# Patient Record
Sex: MALE | Race: OTHER | HISPANIC OR LATINO | URBAN - METROPOLITAN AREA
[De-identification: names, ages, dates, MRNs, and addresses within clinical notes are randomized per-mention and may not be internally consistent; named-entity substitution may affect disease eponyms.]

---

## 2020-04-16 ENCOUNTER — INPATIENT (INPATIENT)
Facility: HOSPITAL | Age: 75
LOS: 7 days | Discharge: NURSING, REHAB OR LONG-TERM CARE | DRG: 177 | End: 2020-04-24
Attending: INTERNAL MEDICINE | Admitting: INTERNAL MEDICINE
Payer: COMMERCIAL

## 2020-04-16 VITALS
OXYGEN SATURATION: 97 % | HEART RATE: 93 BPM | HEIGHT: 70 IN | TEMPERATURE: 103 F | RESPIRATION RATE: 18 BRPM | SYSTOLIC BLOOD PRESSURE: 134 MMHG | WEIGHT: 173.06 LBS | DIASTOLIC BLOOD PRESSURE: 79 MMHG

## 2020-04-16 DIAGNOSIS — E87.1 HYPO-OSMOLALITY AND HYPONATREMIA: ICD-10-CM

## 2020-04-16 DIAGNOSIS — N17.9 ACUTE KIDNEY FAILURE, UNSPECIFIED: ICD-10-CM

## 2020-04-16 DIAGNOSIS — U07.1 COVID-19: ICD-10-CM

## 2020-04-16 DIAGNOSIS — D69.6 THROMBOCYTOPENIA, UNSPECIFIED: ICD-10-CM

## 2020-04-16 DIAGNOSIS — R19.7 DIARRHEA, UNSPECIFIED: ICD-10-CM

## 2020-04-16 DIAGNOSIS — Z29.9 ENCOUNTER FOR PROPHYLACTIC MEASURES, UNSPECIFIED: ICD-10-CM

## 2020-04-16 LAB
ALBUMIN SERPL ELPH-MCNC: 3.1 G/DL — LOW (ref 3.5–5)
ALBUMIN SERPL ELPH-MCNC: 3.4 G/DL — LOW (ref 3.5–5)
ALP SERPL-CCNC: 82 U/L — SIGNIFICANT CHANGE UP (ref 40–120)
ALP SERPL-CCNC: 89 U/L — SIGNIFICANT CHANGE UP (ref 40–120)
ALT FLD-CCNC: 24 U/L DA — SIGNIFICANT CHANGE UP (ref 10–60)
ALT FLD-CCNC: 26 U/L DA — SIGNIFICANT CHANGE UP (ref 10–60)
ANION GAP SERPL CALC-SCNC: 6 MMOL/L — SIGNIFICANT CHANGE UP (ref 5–17)
ANION GAP SERPL CALC-SCNC: 9 MMOL/L — SIGNIFICANT CHANGE UP (ref 5–17)
APTT BLD: 29.8 SEC — SIGNIFICANT CHANGE UP (ref 27.5–36.3)
AST SERPL-CCNC: 28 U/L — SIGNIFICANT CHANGE UP (ref 10–40)
AST SERPL-CCNC: 29 U/L — SIGNIFICANT CHANGE UP (ref 10–40)
BASOPHILS # BLD AUTO: 0.01 K/UL — SIGNIFICANT CHANGE UP (ref 0–0.2)
BASOPHILS # BLD AUTO: 0.01 K/UL — SIGNIFICANT CHANGE UP (ref 0–0.2)
BASOPHILS NFR BLD AUTO: 0.2 % — SIGNIFICANT CHANGE UP (ref 0–2)
BASOPHILS NFR BLD AUTO: 0.2 % — SIGNIFICANT CHANGE UP (ref 0–2)
BILIRUB SERPL-MCNC: 0.6 MG/DL — SIGNIFICANT CHANGE UP (ref 0.2–1.2)
BILIRUB SERPL-MCNC: 0.9 MG/DL — SIGNIFICANT CHANGE UP (ref 0.2–1.2)
BUN SERPL-MCNC: 10 MG/DL — SIGNIFICANT CHANGE UP (ref 7–18)
BUN SERPL-MCNC: 10 MG/DL — SIGNIFICANT CHANGE UP (ref 7–18)
CALCIUM SERPL-MCNC: 8.4 MG/DL — SIGNIFICANT CHANGE UP (ref 8.4–10.5)
CALCIUM SERPL-MCNC: 8.8 MG/DL — SIGNIFICANT CHANGE UP (ref 8.4–10.5)
CHLORIDE SERPL-SCNC: 95 MMOL/L — LOW (ref 96–108)
CHLORIDE SERPL-SCNC: 99 MMOL/L — SIGNIFICANT CHANGE UP (ref 96–108)
CHOLEST SERPL-MCNC: 97 MG/DL — SIGNIFICANT CHANGE UP (ref 10–199)
CK SERPL-CCNC: 238 U/L — HIGH (ref 35–232)
CO2 SERPL-SCNC: 26 MMOL/L — SIGNIFICANT CHANGE UP (ref 22–31)
CO2 SERPL-SCNC: 28 MMOL/L — SIGNIFICANT CHANGE UP (ref 22–31)
CREAT SERPL-MCNC: 1.46 MG/DL — HIGH (ref 0.5–1.3)
CREAT SERPL-MCNC: 1.48 MG/DL — HIGH (ref 0.5–1.3)
CRP SERPL-MCNC: 5.52 MG/DL — HIGH (ref 0–0.4)
D DIMER BLD IA.RAPID-MCNC: 191 NG/ML DDU — SIGNIFICANT CHANGE UP
D DIMER BLD IA.RAPID-MCNC: 228 NG/ML DDU — SIGNIFICANT CHANGE UP
EOSINOPHIL # BLD AUTO: 0 K/UL — SIGNIFICANT CHANGE UP (ref 0–0.5)
EOSINOPHIL # BLD AUTO: 0 K/UL — SIGNIFICANT CHANGE UP (ref 0–0.5)
EOSINOPHIL NFR BLD AUTO: 0 % — SIGNIFICANT CHANGE UP (ref 0–6)
EOSINOPHIL NFR BLD AUTO: 0 % — SIGNIFICANT CHANGE UP (ref 0–6)
ERYTHROCYTE [SEDIMENTATION RATE] IN BLOOD: 36 MM/HR — HIGH (ref 0–20)
FERRITIN SERPL-MCNC: 407 NG/ML — HIGH (ref 30–400)
FIBRINOGEN PPP-MCNC: 908 MG/DL — HIGH (ref 350–510)
GLUCOSE SERPL-MCNC: 232 MG/DL — HIGH (ref 70–99)
GLUCOSE SERPL-MCNC: 237 MG/DL — HIGH (ref 70–99)
HCT VFR BLD CALC: 35.7 % — LOW (ref 39–50)
HCT VFR BLD CALC: 38.3 % — LOW (ref 39–50)
HDLC SERPL-MCNC: 36 MG/DL — LOW
HGB BLD-MCNC: 12 G/DL — LOW (ref 13–17)
HGB BLD-MCNC: 12.7 G/DL — LOW (ref 13–17)
IMM GRANULOCYTES NFR BLD AUTO: 0.4 % — SIGNIFICANT CHANGE UP (ref 0–1.5)
IMM GRANULOCYTES NFR BLD AUTO: 0.7 % — SIGNIFICANT CHANGE UP (ref 0–1.5)
INR BLD: 1.13 RATIO — SIGNIFICANT CHANGE UP (ref 0.88–1.16)
IRON SATN MFR SERPL: 19 UG/DL — LOW (ref 65–170)
LACTATE SERPL-SCNC: 1.4 MMOL/L — SIGNIFICANT CHANGE UP (ref 0.7–2)
LACTATE SERPL-SCNC: 2.9 MMOL/L — HIGH (ref 0.7–2)
LDH SERPL L TO P-CCNC: 213 U/L — SIGNIFICANT CHANGE UP (ref 120–225)
LDH SERPL L TO P-CCNC: 230 U/L — HIGH (ref 120–225)
LIPID PNL WITH DIRECT LDL SERPL: 42 MG/DL — SIGNIFICANT CHANGE UP
LYMPHOCYTES # BLD AUTO: 0.82 K/UL — LOW (ref 1–3.3)
LYMPHOCYTES # BLD AUTO: 0.95 K/UL — LOW (ref 1–3.3)
LYMPHOCYTES # BLD AUTO: 16.7 % — SIGNIFICANT CHANGE UP (ref 13–44)
LYMPHOCYTES # BLD AUTO: 20.7 % — SIGNIFICANT CHANGE UP (ref 13–44)
MAGNESIUM SERPL-MCNC: 1.9 MG/DL — SIGNIFICANT CHANGE UP (ref 1.6–2.6)
MCHC RBC-ENTMCNC: 28.4 PG — SIGNIFICANT CHANGE UP (ref 27–34)
MCHC RBC-ENTMCNC: 28.8 PG — SIGNIFICANT CHANGE UP (ref 27–34)
MCHC RBC-ENTMCNC: 33.2 GM/DL — SIGNIFICANT CHANGE UP (ref 32–36)
MCHC RBC-ENTMCNC: 33.6 GM/DL — SIGNIFICANT CHANGE UP (ref 32–36)
MCV RBC AUTO: 84.4 FL — SIGNIFICANT CHANGE UP (ref 80–100)
MCV RBC AUTO: 86.8 FL — SIGNIFICANT CHANGE UP (ref 80–100)
MONOCYTES # BLD AUTO: 0.33 K/UL — SIGNIFICANT CHANGE UP (ref 0–0.9)
MONOCYTES # BLD AUTO: 0.35 K/UL — SIGNIFICANT CHANGE UP (ref 0–0.9)
MONOCYTES NFR BLD AUTO: 7.1 % — SIGNIFICANT CHANGE UP (ref 2–14)
MONOCYTES NFR BLD AUTO: 7.2 % — SIGNIFICANT CHANGE UP (ref 2–14)
NEUTROPHILS # BLD AUTO: 3.28 K/UL — SIGNIFICANT CHANGE UP (ref 1.8–7.4)
NEUTROPHILS # BLD AUTO: 3.71 K/UL — SIGNIFICANT CHANGE UP (ref 1.8–7.4)
NEUTROPHILS NFR BLD AUTO: 71.2 % — SIGNIFICANT CHANGE UP (ref 43–77)
NEUTROPHILS NFR BLD AUTO: 75.6 % — SIGNIFICANT CHANGE UP (ref 43–77)
NRBC # BLD: 0 /100 WBCS — SIGNIFICANT CHANGE UP (ref 0–0)
NRBC # BLD: 0 /100 WBCS — SIGNIFICANT CHANGE UP (ref 0–0)
PHOSPHATE SERPL-MCNC: 3.8 MG/DL — SIGNIFICANT CHANGE UP (ref 2.5–4.5)
PLATELET # BLD AUTO: 88 K/UL — LOW (ref 150–400)
PLATELET # BLD AUTO: 97 K/UL — LOW (ref 150–400)
POTASSIUM SERPL-MCNC: 4.6 MMOL/L — SIGNIFICANT CHANGE UP (ref 3.5–5.3)
POTASSIUM SERPL-MCNC: 4.9 MMOL/L — SIGNIFICANT CHANGE UP (ref 3.5–5.3)
POTASSIUM SERPL-SCNC: 4.6 MMOL/L — SIGNIFICANT CHANGE UP (ref 3.5–5.3)
POTASSIUM SERPL-SCNC: 4.9 MMOL/L — SIGNIFICANT CHANGE UP (ref 3.5–5.3)
PROCALCITONIN SERPL-MCNC: 0.21 NG/ML — HIGH (ref 0.02–0.1)
PROT SERPL-MCNC: 7.4 G/DL — SIGNIFICANT CHANGE UP (ref 6–8.3)
PROT SERPL-MCNC: 8 G/DL — SIGNIFICANT CHANGE UP (ref 6–8.3)
PROTHROM AB SERPL-ACNC: 12.8 SEC — SIGNIFICANT CHANGE UP (ref 10–12.9)
RBC # BLD: 4.23 M/UL — SIGNIFICANT CHANGE UP (ref 4.2–5.8)
RBC # BLD: 4.41 M/UL — SIGNIFICANT CHANGE UP (ref 4.2–5.8)
RBC # FLD: 12.2 % — SIGNIFICANT CHANGE UP (ref 10.3–14.5)
RBC # FLD: 12.3 % — SIGNIFICANT CHANGE UP (ref 10.3–14.5)
SARS-COV-2 RNA SPEC QL NAA+PROBE: DETECTED
SODIUM SERPL-SCNC: 130 MMOL/L — LOW (ref 135–145)
SODIUM SERPL-SCNC: 133 MMOL/L — LOW (ref 135–145)
TOTAL CHOLESTEROL/HDL RATIO MEASUREMENT: 2.7 RATIO — LOW (ref 3.4–9.6)
TRIGL SERPL-MCNC: 93 MG/DL — SIGNIFICANT CHANGE UP (ref 10–149)
TROPONIN I SERPL-MCNC: <0.015 NG/ML — SIGNIFICANT CHANGE UP (ref 0–0.04)
WBC # BLD: 4.6 K/UL — SIGNIFICANT CHANGE UP (ref 3.8–10.5)
WBC # BLD: 4.91 K/UL — SIGNIFICANT CHANGE UP (ref 3.8–10.5)
WBC # FLD AUTO: 4.6 K/UL — SIGNIFICANT CHANGE UP (ref 3.8–10.5)
WBC # FLD AUTO: 4.91 K/UL — SIGNIFICANT CHANGE UP (ref 3.8–10.5)

## 2020-04-16 PROCEDURE — 74177 CT ABD & PELVIS W/CONTRAST: CPT | Mod: 26

## 2020-04-16 PROCEDURE — 71045 X-RAY EXAM CHEST 1 VIEW: CPT | Mod: 26

## 2020-04-16 PROCEDURE — 99285 EMERGENCY DEPT VISIT HI MDM: CPT

## 2020-04-16 RX ORDER — GUAIFENESIN/DEXTROMETHORPHAN 600MG-30MG
10 TABLET, EXTENDED RELEASE 12 HR ORAL EVERY 4 HOURS
Refills: 0 | Status: DISCONTINUED | OUTPATIENT
Start: 2020-04-16 | End: 2020-04-24

## 2020-04-16 RX ORDER — HYDROXYCHLOROQUINE SULFATE 200 MG
800 TABLET ORAL EVERY 24 HOURS
Refills: 0 | Status: COMPLETED | OUTPATIENT
Start: 2020-04-16 | End: 2020-04-16

## 2020-04-16 RX ORDER — ONDANSETRON 8 MG/1
4 TABLET, FILM COATED ORAL EVERY 6 HOURS
Refills: 0 | Status: DISCONTINUED | OUTPATIENT
Start: 2020-04-16 | End: 2020-04-24

## 2020-04-16 RX ORDER — SODIUM CHLORIDE 9 MG/ML
500 INJECTION INTRAMUSCULAR; INTRAVENOUS; SUBCUTANEOUS ONCE
Refills: 0 | Status: COMPLETED | OUTPATIENT
Start: 2020-04-16 | End: 2020-04-16

## 2020-04-16 RX ORDER — HYDROXYCHLOROQUINE SULFATE 200 MG
TABLET ORAL
Refills: 0 | Status: COMPLETED | OUTPATIENT
Start: 2020-04-16 | End: 2020-04-20

## 2020-04-16 RX ORDER — ACETAMINOPHEN 500 MG
1000 TABLET ORAL ONCE
Refills: 0 | Status: COMPLETED | OUTPATIENT
Start: 2020-04-16 | End: 2020-04-16

## 2020-04-16 RX ORDER — ENOXAPARIN SODIUM 100 MG/ML
40 INJECTION SUBCUTANEOUS DAILY
Refills: 0 | Status: DISCONTINUED | OUTPATIENT
Start: 2020-04-16 | End: 2020-04-24

## 2020-04-16 RX ORDER — SODIUM CHLORIDE 9 MG/ML
1000 INJECTION INTRAMUSCULAR; INTRAVENOUS; SUBCUTANEOUS ONCE
Refills: 0 | Status: COMPLETED | OUTPATIENT
Start: 2020-04-16 | End: 2020-04-16

## 2020-04-16 RX ORDER — HYDROXYCHLOROQUINE SULFATE 200 MG
400 TABLET ORAL EVERY 24 HOURS
Refills: 0 | Status: COMPLETED | OUTPATIENT
Start: 2020-04-17 | End: 2020-04-20

## 2020-04-16 RX ORDER — SODIUM CHLORIDE 9 MG/ML
1000 INJECTION, SOLUTION INTRAVENOUS
Refills: 0 | Status: DISCONTINUED | OUTPATIENT
Start: 2020-04-16 | End: 2020-04-19

## 2020-04-16 RX ORDER — ACETAMINOPHEN 500 MG
650 TABLET ORAL EVERY 6 HOURS
Refills: 0 | Status: DISCONTINUED | OUTPATIENT
Start: 2020-04-16 | End: 2020-04-24

## 2020-04-16 RX ORDER — ONDANSETRON 8 MG/1
4 TABLET, FILM COATED ORAL ONCE
Refills: 0 | Status: COMPLETED | OUTPATIENT
Start: 2020-04-16 | End: 2020-04-16

## 2020-04-16 RX ORDER — MORPHINE SULFATE 50 MG/1
2 CAPSULE, EXTENDED RELEASE ORAL ONCE
Refills: 0 | Status: DISCONTINUED | OUTPATIENT
Start: 2020-04-16 | End: 2020-04-16

## 2020-04-16 RX ORDER — ACETAMINOPHEN 500 MG
2 TABLET ORAL
Qty: 40 | Refills: 0
Start: 2020-04-16 | End: 2020-04-20

## 2020-04-16 RX ORDER — ONDANSETRON 8 MG/1
1 TABLET, FILM COATED ORAL
Qty: 15 | Refills: 0
Start: 2020-04-16 | End: 2020-04-20

## 2020-04-16 RX ORDER — ALBUTEROL 90 UG/1
2 AEROSOL, METERED ORAL EVERY 4 HOURS
Refills: 0 | Status: DISCONTINUED | OUTPATIENT
Start: 2020-04-16 | End: 2020-04-24

## 2020-04-16 RX ADMIN — ENOXAPARIN SODIUM 40 MILLIGRAM(S): 100 INJECTION SUBCUTANEOUS at 14:15

## 2020-04-16 RX ADMIN — ONDANSETRON 4 MILLIGRAM(S): 8 TABLET, FILM COATED ORAL at 17:29

## 2020-04-16 RX ADMIN — Medication 400 MILLIGRAM(S): at 03:45

## 2020-04-16 RX ADMIN — ONDANSETRON 4 MILLIGRAM(S): 8 TABLET, FILM COATED ORAL at 03:45

## 2020-04-16 RX ADMIN — Medication 800 MILLIGRAM(S): at 13:03

## 2020-04-16 RX ADMIN — Medication 650 MILLIGRAM(S): at 18:35

## 2020-04-16 RX ADMIN — SODIUM CHLORIDE 60 MILLILITER(S): 9 INJECTION, SOLUTION INTRAVENOUS at 18:35

## 2020-04-16 RX ADMIN — MORPHINE SULFATE 2 MILLIGRAM(S): 50 CAPSULE, EXTENDED RELEASE ORAL at 09:42

## 2020-04-16 RX ADMIN — SODIUM CHLORIDE 1000 MILLILITER(S): 9 INJECTION INTRAMUSCULAR; INTRAVENOUS; SUBCUTANEOUS at 03:45

## 2020-04-16 RX ADMIN — SODIUM CHLORIDE 500 MILLILITER(S): 9 INJECTION INTRAMUSCULAR; INTRAVENOUS; SUBCUTANEOUS at 13:13

## 2020-04-16 NOTE — H&P ADULT - PROBLEM SELECTOR PLAN 2
suspect pre renal due to  Hypovolemia   On admission Cr is 1.46  and Baseline creatinine is Unknown  F/U U/A   F/U Urine lytes  Deferring  renal ultrasound due to COVID  Gentle IVF, reassess  Monitor Creatinine  Renally dose medications, avoid nephrotoxins

## 2020-04-16 NOTE — H&P ADULT - NSHPPHYSICALEXAM_GEN_ALL_CORE
· CONSTITUTIONAL: Well appearing, awake, alert, oriented to person, place, time/situation and in no apparent distress.  · ENMT: Airway patent, Nasal mucosa clear. Mouth with normal mucosa. Throat has no vesicles, no oropharyngeal exudates and uvula is midline.  · EYES: Clear bilaterally, pupils equal, round and reactive to light.  · CARDIAC: Normal rate, regular rhythm.  Heart sounds S1, S2.  No murmurs, rubs or gallops.  · RESPIRATORY: Breath sounds clear and equal bilaterally.  · GASTROINTESTINAL: Abdomen soft, non-tender, no guarding.  · MUSCULOSKELETAL: Spine appears normal, range of motion is not limited, no muscle or joint tenderness  · NEUROLOGICAL: Alert and oriented, no focal deficits, no motor or sensory deficits.  · SKIN: Skin normal color for race, warm, dry and intact. No evidence of rash.  · PSYCHIATRIC: Alert and oriented to person, place, time/situation. normal mood and affect. no apparent risk to self or others.  · HEME LYMPH: No adenopathy or splenomegaly. No cervical or inguinal lymphadenopathy.

## 2020-04-16 NOTE — H&P ADULT - PROBLEM SELECTOR PLAN 5
Pt has diarrhea after eating outside food.  DDx Viral Gastroenteritis vs COVID infection  f/u GI PCR  F/U stool ova and cultures  f/u Calprotectin, ESR, CRP, Stool cultures

## 2020-04-16 NOTE — H&P ADULT - PROBLEM SELECTOR PLAN 1
Pt is saturating on 97 on RA   CXR : Bilateral opacities are present  CT Abdomen showed Bilateral opacities are present.  s/p 1.5L in ED    f/u Flu, COVID, RVP  f/u D dimer, Ferritin, LDH, Procalcitonin, ESR, CRP  f/u Strep, Mycoplasma, Legionella  Isolation precautions  Started on Hydroxychloroquine

## 2020-04-16 NOTE — ED ADULT NURSE NOTE - NS ED NURSE LEVEL OF CONSCIOUSNESS AFFECT
NEUROLOGICAL - ADULT    • Achieves stable or improved neurological status Not Progressing          CARDIOVASCULAR - ADULT    • Maintains optimal cardiac output and hemodynamic stability Progressing    • Absence of cardiac arrhythmias or at baseline Progres Calm

## 2020-04-16 NOTE — ED ADULT NURSE REASSESSMENT NOTE - NS ED NURSE REASSESS COMMENT FT1
received pt to 2, pt a/o x3, with complaints of abd pain/nausea since Saturday with intermittent fever. pt breathing evenly/unlabored, no cp. skin warm/dry. iv placed/labs drawn. meds as noted. rn assessment done. pt for md gonzalez

## 2020-04-16 NOTE — ED PROVIDER NOTE - PROGRESS NOTE DETAILS
patient's son left contact, tashaopal maurilio, # [phone] Patient xray consistent with covid. abd nontender on repeat exam. patient endorses symptoms resolved. tolerated po. instructed to continue po hydration at home. return for new or worsening symptoms including sob. otherwise meds provided for nausea and pain, saturation stable on dc patient on discharge noting again with abd pain. will perform ct, r.o surgical pathology, pain control and reassess Patient still feeling weak, abdominal pain. CT reveals pneumonia, no abdominal findings. admit for hydration pain control

## 2020-04-16 NOTE — H&P ADULT - ASSESSMENT
74 y.o presenting with abd pain, endorses n, v, diarrhea. endorses abd pain. Admitted to medicine for Pneumonia and suspicion for COVID.

## 2020-04-16 NOTE — H&P ADULT - PROBLEM SELECTOR PLAN 6
IMPROVE VTE Individual Risk Assessment    RISK                                                                Points  [  ] Previous VTE                                                  3  [  ] Thrombophilia                                               2  [  ] Lower limb paralysis                                      2        (unable to hold up >15 seconds)    [  ] Current Cancer                                              2         (within 6 months)  [x ] Immobilization > 24 hrs                                1  [  ] ICU/CCU stay > 24 hours                              1  [x  ] Age > 60                                                      1  IMPROVE VTE Score ___DVT ppx Lovenox 40 sub q______  )

## 2020-04-16 NOTE — ED PROVIDER NOTE - OBJECTIVE STATEMENT
74 y.o presenting with abd pain, endorses n, v, diarrhea. endorses abd pain as diffused. fever noted on arrival. denies recent travel, sick contact, cp, cough.

## 2020-04-16 NOTE — ED PROVIDER NOTE - PATIENT PORTAL LINK FT
You can access the FollowMyHealth Patient Portal offered by [facility] by registering at the following website: [email or web address]. By joining [facility]’s FollowMyHealth portal, you will also be able to view your health information using other applications (apps) compatible with our system.

## 2020-04-16 NOTE — ED PROVIDER NOTE - CLINICAL SUMMARY MEDICAL DECISION MAKING FREE TEXT BOX
Patient presenting with fever, abd, diarrhea, vomiting. no point tenderness on exam. will obtain lab, xray, ct, assess for covid, surgical abd. ed obs and reassess Patient presenting with fever, abd, diarrhea, vomiting. no point tenderness on exam. will obtain lab, xray, ct, assess for covid, surgical abd. ed obs and reassess. symptoms over 1 week, saturation stable, will reassess

## 2020-04-16 NOTE — H&P ADULT - ATTENDING COMMENTS
74 y.o presenting with abd pain, endorses n, v, diarrhea. endorses abd pain as diffused. fever noted on arrival. denies recent travel, sick contact, cp, cough.    adm pt seen in bed, vitals stable, physical exam reveals lungs basilar crackles, heart s1s2, abd soft nd nt bs+, ext no edema. labs and diagnostic test result reviewed    assessment  --  pneumonia 2nd to covid- 19, hyponatremia, saida likely 2nd to diarrhea 2nd to covid-19      plan  --  admit to med, plaquenil, ergocalciferol, contact and airborne isolation, cont albuterol inhaler, cont supportive care with tylenol prn, robitussin prn and O2 via nasal canula as needed, ivf ns    procalcitonin, legionella Ag, strep, mycoplasma Ag, F/u aptt, inr, D-dimer, esr, crp, ldh, ferritin, lactate, t cell subset, cbc, bmp, mg, phos, lipids, tsh, bld cx, ua, ucx      pulm cons

## 2020-04-16 NOTE — ED PROVIDER NOTE - CARE PLAN
Principal Discharge DX:	Viral illness Principal Discharge DX:	COVID-19 virus detected  Secondary Diagnosis:	Abdominal pain

## 2020-04-16 NOTE — H&P ADULT - NSHPREVIEWOFSYSTEMS_GEN_ALL_CORE
REVIEW OF SYSTEMS:    CONSTITUTIONAL: No weakness, fevers or chills  EYES/ENT: No visual changes;  No vertigo or throat pain   NECK: No pain or stiffness  RESPIRATORY: No cough, wheezing, hemoptysis; No shortness of breath  CARDIOVASCULAR: No chest pain or palpitations  GASTROINTESTINAL:  abdominal or epigastric pain. nausea, vomiting,   GENITOURINARY: No dysuria, frequency or hematuria  NEUROLOGICAL: No numbness or weakness  SKIN: No itching, burning, rashes, or lesions   All other review of systems is negative unless indicated above.

## 2020-04-17 LAB
24R-OH-CALCIDIOL SERPL-MCNC: 57.9 NG/ML — SIGNIFICANT CHANGE UP (ref 30–80)
A1C WITH ESTIMATED AVERAGE GLUCOSE RESULT: 7.3 % — HIGH (ref 4–5.6)
ALBUMIN SERPL ELPH-MCNC: 2.6 G/DL — LOW (ref 3.5–5)
ALP SERPL-CCNC: 84 U/L — SIGNIFICANT CHANGE UP (ref 40–120)
ALT FLD-CCNC: 24 U/L DA — SIGNIFICANT CHANGE UP (ref 10–60)
ANION GAP SERPL CALC-SCNC: 7 MMOL/L — SIGNIFICANT CHANGE UP (ref 5–17)
AST SERPL-CCNC: 30 U/L — SIGNIFICANT CHANGE UP (ref 10–40)
BILIRUB SERPL-MCNC: 0.6 MG/DL — SIGNIFICANT CHANGE UP (ref 0.2–1.2)
BUN SERPL-MCNC: 10 MG/DL — SIGNIFICANT CHANGE UP (ref 7–18)
CALCIUM SERPL-MCNC: 7.9 MG/DL — LOW (ref 8.4–10.5)
CHLORIDE SERPL-SCNC: 104 MMOL/L — SIGNIFICANT CHANGE UP (ref 96–108)
CO2 SERPL-SCNC: 25 MMOL/L — SIGNIFICANT CHANGE UP (ref 22–31)
CREAT SERPL-MCNC: 1.32 MG/DL — HIGH (ref 0.5–1.3)
CRP SERPL-MCNC: 6.76 MG/DL — HIGH (ref 0–0.4)
ESTIMATED AVERAGE GLUCOSE: 163 MG/DL — HIGH (ref 68–114)
FERRITIN SERPL-MCNC: 487 NG/ML — HIGH (ref 30–400)
FOLATE SERPL-MCNC: >20 NG/ML — SIGNIFICANT CHANGE UP
GLUCOSE SERPL-MCNC: 172 MG/DL — HIGH (ref 70–99)
HAV IGM SER-ACNC: ABNORMAL
HBV CORE IGM SER-ACNC: SIGNIFICANT CHANGE UP
HBV SURFACE AG SER-ACNC: SIGNIFICANT CHANGE UP
HCT VFR BLD CALC: 34.6 % — LOW (ref 39–50)
HCV AB S/CO SERPL IA: 0.12 S/CO — SIGNIFICANT CHANGE UP (ref 0–0.99)
HCV AB SERPL-IMP: SIGNIFICANT CHANGE UP
HGB BLD-MCNC: 11.3 G/DL — LOW (ref 13–17)
HIV 1+2 AB+HIV1 P24 AG SERPL QL IA: SIGNIFICANT CHANGE UP
MCHC RBC-ENTMCNC: 28.8 PG — SIGNIFICANT CHANGE UP (ref 27–34)
MCHC RBC-ENTMCNC: 32.7 GM/DL — SIGNIFICANT CHANGE UP (ref 32–36)
MCV RBC AUTO: 88.3 FL — SIGNIFICANT CHANGE UP (ref 80–100)
NRBC # BLD: 0 /100 WBCS — SIGNIFICANT CHANGE UP (ref 0–0)
PLATELET # BLD AUTO: 96 K/UL — LOW (ref 150–400)
POTASSIUM SERPL-MCNC: 4.3 MMOL/L — SIGNIFICANT CHANGE UP (ref 3.5–5.3)
POTASSIUM SERPL-SCNC: 4.3 MMOL/L — SIGNIFICANT CHANGE UP (ref 3.5–5.3)
PROCALCITONIN SERPL-MCNC: 0.19 NG/ML — HIGH (ref 0.02–0.1)
PROT SERPL-MCNC: 6.5 G/DL — SIGNIFICANT CHANGE UP (ref 6–8.3)
RAPID RVP RESULT: SIGNIFICANT CHANGE UP
RBC # BLD: 3.92 M/UL — LOW (ref 4.2–5.8)
RBC # FLD: 12.6 % — SIGNIFICANT CHANGE UP (ref 10.3–14.5)
S PYO DNA THROAT QL NAA+PROBE: SIGNIFICANT CHANGE UP
SODIUM SERPL-SCNC: 136 MMOL/L — SIGNIFICANT CHANGE UP (ref 135–145)
VIT B12 SERPL-MCNC: 1286 PG/ML — HIGH (ref 232–1245)
WBC # BLD: 5.72 K/UL — SIGNIFICANT CHANGE UP (ref 3.8–10.5)
WBC # FLD AUTO: 5.72 K/UL — SIGNIFICANT CHANGE UP (ref 3.8–10.5)

## 2020-04-17 RX ORDER — SODIUM CHLORIDE 9 MG/ML
500 INJECTION INTRAMUSCULAR; INTRAVENOUS; SUBCUTANEOUS ONCE
Refills: 0 | Status: DISCONTINUED | OUTPATIENT
Start: 2020-04-17 | End: 2020-04-17

## 2020-04-17 RX ORDER — SODIUM CHLORIDE 9 MG/ML
500 INJECTION INTRAMUSCULAR; INTRAVENOUS; SUBCUTANEOUS ONCE
Refills: 0 | Status: COMPLETED | OUTPATIENT
Start: 2020-04-17 | End: 2020-04-17

## 2020-04-17 RX ADMIN — ONDANSETRON 4 MILLIGRAM(S): 8 TABLET, FILM COATED ORAL at 09:30

## 2020-04-17 RX ADMIN — SODIUM CHLORIDE 1000 MILLILITER(S): 9 INJECTION INTRAMUSCULAR; INTRAVENOUS; SUBCUTANEOUS at 03:34

## 2020-04-17 RX ADMIN — ONDANSETRON 4 MILLIGRAM(S): 8 TABLET, FILM COATED ORAL at 20:55

## 2020-04-17 RX ADMIN — ENOXAPARIN SODIUM 40 MILLIGRAM(S): 100 INJECTION SUBCUTANEOUS at 12:32

## 2020-04-17 RX ADMIN — Medication 650 MILLIGRAM(S): at 13:04

## 2020-04-17 RX ADMIN — Medication 10 MILLILITER(S): at 01:47

## 2020-04-17 RX ADMIN — Medication 400 MILLIGRAM(S): at 12:32

## 2020-04-17 RX ADMIN — Medication 650 MILLIGRAM(S): at 01:47

## 2020-04-17 NOTE — CONSULT NOTE ADULT - ASSESSMENT
1.  PNA  - Likely 2nd to Covid-19.   - Covid-19 PCR positive   - CXR noted; ? small effusion    Hold diuretics given CARITO.   - Continue Plaquenil   - Continue Vit C, Thiamine, Zinc   - Robitussin PRN for cough  - Tylenol PRN for temp  - Oxygen supplement   - Taper off O2 as tolerate per patient   - Monitor labs  - Monitor VS. temp and SpO2.   - DVT and GI PPX   - F/U CXR.   - Albuterol MDI - PRN  - Isolation Precautions  - Intermittent prone positioning as tolerated by pt     2. Dizziness  - S/P RRT with hypotension and bradycardia  - Continue IVF  - Monitor VS.     3. CARITO   - Likely 2nd to dehydration   - Continue IVF  - Monitor labs.   - Avoid nephrotoxic drugs.

## 2020-04-17 NOTE — PROGRESS NOTE ADULT - SUBJECTIVE AND OBJECTIVE BOX
Patient is a 74y old  Male who presents with a chief complaint of Abdominal pain (16 Apr 2020 11:46)    pt seen in icu [  ], reg med floor [   ], bed [  ], chair at bedside [   ], a+o x3 [  ], lethargic [  ],  nad [  ]    hunter [  ], ngt [  ], peg [  ], et tube [  ], cent line [  ], picc line [  ]        Allergies    No Known Allergies        Vitals    T(F): 98.5 (04-17-20 @ 00:42), Max: 100.4 (04-16-20 @ 18:43)  HR: 73 (04-17-20 @ 06:01) (60 - 90)  BP: 114/62 (04-17-20 @ 06:01) (101/60 - 148/75)  RR: 22 (04-17-20 @ 00:42) (16 - 22)  SpO2: 98% (04-17-20 @ 00:42) (97% - 99%)  Wt(kg): --  CAPILLARY BLOOD GLUCOSE          Labs                          11.3   5.72  )-----------( 96       ( 17 Apr 2020 05:58 )             34.6       04-16    133<L>  |  99  |  10  ----------------------------<  237<H>  4.9   |  28  |  1.48<H>    Ca    8.4      16 Apr 2020 15:26  Phos  3.8     04-16  Mg     1.9     04-16    TPro  7.4  /  Alb  3.1<L>  /  TBili  0.6  /  DBili  x   /  AST  29  /  ALT  24  /  AlkPhos  82  04-16      CARDIAC MARKERS ( 16 Apr 2020 15:26 )  <0.015 ng/mL / x     / 238 U/L / x     / x                Radiology Results      Meds    MEDICATIONS  (STANDING):  enoxaparin Injectable 40 milliGRAM(s) SubCutaneous daily  hydroxychloroquine   Oral   hydroxychloroquine 400 milliGRAM(s) Oral every 24 hours  lactated ringers. 1000 milliLiter(s) (60 mL/Hr) IV Continuous <Continuous>      MEDICATIONS  (PRN):  acetaminophen   Tablet .. 650 milliGRAM(s) Oral every 6 hours PRN Temp greater or equal to 38C (100.4F), Mild Pain (1 - 3)  ALBUTerol    90 MICROgram(s) HFA Inhaler 2 Puff(s) Inhalation every 4 hours PRN Shortness of Breath and/or Wheezing  guaifenesin/dextromethorphan  Syrup 10 milliLiter(s) Oral every 4 hours PRN Cough  ondansetron Injectable 4 milliGRAM(s) IV Push every 6 hours PRN Nausea and/or Vomiting      Physical Exam    Neuro :  no focal deficits  Respiratory: CTA B/L  CV: RRR, S1S2, no murmurs,   Abdominal: Soft, NT, ND +BS,  Extremities: No edema, + peripheral pulses    ASSESSMENT    COVID-19      PLAN 74 y.o presenting with abd pain, endorses n, v, diarrhea. endorses abd pain as diffused. fever noted on arrival. denies recent travel, sick contact, cp, cough.    Review of Systems:  Review of Systems: REVIEW OF SYSTEMS:   CONSTITUTIONAL: No weakness, fevers or chills  EYES/ENT: No visual changes;  No vertigo or throat pain   NECK: No pain or stiffness  RESPIRATORY: No cough, wheezing, hemoptysis; No shortness of breath  CARDIOVASCULAR: No chest pain or palpitations  GASTROINTESTINAL:  abdominal or epigastric pain. nausea, vomiting,   GENITOURINARY: No dysuria, frequency or hematuria  NEUROLOGICAL: No numbness or weakness  SKIN: No itching, burning, rashes, or lesions  All other review of systems is negative unless indicated above.	      pt seen in icu [  ], reg med floor [  x ], bed [ x ], chair at bedside [   ], a+o x3 [x], lethargic [  ],  nad [ x ]    still with some diarrhea    pt s/p RRT called at approximately 2:30 AM due to sudden-onset dizziness and noted hypotension to 80/40 noted by nursing as well as bradycardia to 30s.       Allergies    No Known Allergies        Vitals    T(F): 98.5 (04-17-20 @ 00:42), Max: 100.4 (04-16-20 @ 18:43)  HR: 73 (04-17-20 @ 06:01) (60 - 90)  BP: 114/62 (04-17-20 @ 06:01) (101/60 - 148/75)  RR: 22 (04-17-20 @ 00:42) (16 - 22)  SpO2: 98% (04-17-20 @ 00:42) (97% - 99%)  Wt(kg): --  CAPILLARY BLOOD GLUCOSE          Labs                          11.3   5.72  )-----------( 96       ( 17 Apr 2020 05:58 )             34.6       04-16    133<L>  |  99  |  10  ----------------------------<  237<H>  4.9   |  28  |  1.48<H>    Ca    8.4      16 Apr 2020 15:26  Phos  3.8     04-16  Mg     1.9     04-16    TPro  7.4  /  Alb  3.1<L>  /  TBili  0.6  /  DBili  x   /  AST  29  /  ALT  24  /  AlkPhos  82  04-16      CARDIAC MARKERS ( 16 Apr 2020 15:26 )  <0.015 ng/mL / x     / 238 U/L / x     / x          D-Dimer Assay, Quantitative: 228 ng/mL DDU (04.16.20 @ 14:32)    C-Reactive Protein, Serum: 6.76 mg/dL (04.17.20 @ 00:17)    Ferritin, Serum: 487 ng/mL (04.17.20 @ 00:17)          Radiology Results      Meds    MEDICATIONS  (STANDING):  enoxaparin Injectable 40 milliGRAM(s) SubCutaneous daily  hydroxychloroquine   Oral   hydroxychloroquine 400 milliGRAM(s) Oral every 24 hours  lactated ringers. 1000 milliLiter(s) (60 mL/Hr) IV Continuous <Continuous>      MEDICATIONS  (PRN):  acetaminophen   Tablet .. 650 milliGRAM(s) Oral every 6 hours PRN Temp greater or equal to 38C (100.4F), Mild Pain (1 - 3)  ALBUTerol    90 MICROgram(s) HFA Inhaler 2 Puff(s) Inhalation every 4 hours PRN Shortness of Breath and/or Wheezing  guaifenesin/dextromethorphan  Syrup 10 milliLiter(s) Oral every 4 hours PRN Cough  ondansetron Injectable 4 milliGRAM(s) IV Push every 6 hours PRN Nausea and/or Vomiting      Physical Exam    Neuro :  no focal deficits  Respiratory: CTA B/L  CV: RRR, S1S2, no murmurs,   Abdominal: Soft, NT, ND +BS,  Extremities: No edema, + peripheral pulses    ASSESSMENT    pneumonia 2nd to covid- 19,   hyponatremia,   saida likely 2nd to diarrhea 2nd to covid-19    hypotension likely 2nd to dehydration      PLAN      cont albuterol inhaler  pulm f/u  afebrile   tmx 100.4   O2 sat on 2L n/c 98% ( range 97% - 99%)  cont O2 via n/c as needed   covid test positive  cont plaquenil day 2/5  contact and airborne isolation  D-dimer, crp, ferritin noted above  cont supportive care with tylenol prn, robitussin prn   cont ivf  f/u stool studies  f/u bmp  cont current meds

## 2020-04-17 NOTE — CONSULT NOTE ADULT - SUBJECTIVE AND OBJECTIVE BOX
PULMONARY CONSULT NOTE      RHEA FRANCO  MRN-[id number]    Patient is a 74y old  Male who presents with a chief complaint of Abdominal pain (17 Apr 2020 06:17)      HISTORY OF PRESENT ILLNESS:  History of Present Illness:  Reason for Admission: Abdominal pain	  History of Present Illness: 	  74 y.o presenting with abd pain, endorses n, v, diarrhea. endorses abd pain as diffused. fever noted on arrival. denies recent travel, sick contact, cp, cough.    Pt is awake, alert, lying in bed. Still SOB and with cough. Some diarrhea.     MEDICATIONS  (STANDING):  enoxaparin Injectable 40 milliGRAM(s) SubCutaneous daily  hydroxychloroquine   Oral   hydroxychloroquine 400 milliGRAM(s) Oral every 24 hours  lactated ringers. 1000 milliLiter(s) (60 mL/Hr) IV Continuous <Continuous>      MEDICATIONS  (PRN):  acetaminophen   Tablet .. 650 milliGRAM(s) Oral every 6 hours PRN Temp greater or equal to 38C (100.4F), Mild Pain (1 - 3)  ALBUTerol    90 MICROgram(s) HFA Inhaler 2 Puff(s) Inhalation every 4 hours PRN Shortness of Breath and/or Wheezing  guaifenesin/dextromethorphan  Syrup 10 milliLiter(s) Oral every 4 hours PRN Cough  ondansetron Injectable 4 milliGRAM(s) IV Push every 6 hours PRN Nausea and/or Vomiting      Allergies    No Known Allergies    Intolerances        PAST MEDICAL & SURGICAL HISTORY:      FAMILY HISTORY:      SOCIAL HISTORY  Smoking History:     REVIEW OF SYSTEMS:    CONSTITUTIONAL:  No fevers, chills, sweats    HEENT:  Eyes:  No diplopia or blurred vision. ENT:  No earache, sore throat or runny nose.    CARDIOVASCULAR:  No pressure, squeezing, tightness, or heaviness about the chest; no palpitations.    RESPIRATORY:  Per HPI    GASTROINTESTINAL:  No abdominal pain, nausea, vomiting or diarrhea.    GENITOURINARY:  No dysuria, frequency or urgency.    NEUROLOGIC:  No paresthesias, fasciculations, seizures or weakness.    PSYCHIATRIC:  No disorder of thought or mood.    Vital Signs Last 24 Hrs  T(C): 37.1 (17 Apr 2020 08:15), Max: 38 (16 Apr 2020 18:43)  T(F): 98.7 (17 Apr 2020 08:15), Max: 100.4 (16 Apr 2020 18:43)  HR: 68 (17 Apr 2020 08:15) (67 - 90)  BP: 112/59 (17 Apr 2020 08:15) (104/61 - 146/70)  BP(mean): --  RR: 20 (17 Apr 2020 08:15) (16 - 22)  SpO2: 98% (17 Apr 2020 08:15) (97% - 99%)  I&O's Detail    16 Apr 2020 07:01  -  17 Apr 2020 07:00  --------------------------------------------------------  IN:    lactated ringers.: 300 mL    Sodium Chloride 0.9% IV Bolus: 1500 mL  Total IN: 1800 mL    OUT:    Voided: 350 mL  Total OUT: 350 mL    Total NET: 1450 mL          PHYSICAL EXAMINATION:    GENERAL: The patient is a well-developed, well-nourished _____in no apparent distress.     HEENT: Head is normocephalic and atraumatic. Extraocular muscles are intact. Mucous membranes are moist.     NECK: Supple.     LUNGS: Clear to auscultation without wheezing, rales, or rhonchi. Respirations unlabored    HEART: Regular rate and rhythm without murmur.    ABDOMEN: Soft, nontender, and nondistended.  No hepatosplenomegaly is noted.    EXTREMITIES: Without any cyanosis, clubbing, rash, lesions or edema.    NEUROLOGIC: Grossly intact.      LABS:                        11.3   5.72  )-----------( 96       ( 17 Apr 2020 05:58 )             34.6     04-17    136  |  104  |  10  ----------------------------<  172<H>  4.3   |  25  |  1.32<H>    Ca    7.9<L>      17 Apr 2020 05:58  Phos  3.8     04-16  Mg     1.9     04-16    TPro  6.5  /  Alb  2.6<L>  /  TBili  0.6  /  DBili  x   /  AST  30  /  ALT  24  /  AlkPhos  84  04-17    PT/INR - ( 16 Apr 2020 14:32 )   PT: 12.8 sec;   INR: 1.13 ratio         PTT - ( 16 Apr 2020 14:32 )  PTT:29.8 sec      CARDIAC MARKERS ( 16 Apr 2020 15:26 )  <0.015 ng/mL / x     / 238 U/L / x     / x          D-Dimer Assay, Quantitative: 228 ng/mL DDU (04-16-20 @ 14:32)      Lactate, Blood: 2.9 mmol/L (04-16-20 @ 14:32)    Procalcitonin, Serum: 0.19 ng/mL (04-17-20 @ 00:01)  Procalcitonin, Serum: 0.21 ng/mL (04-16-20 @ 06:05)    COVID-19 PCR . (04.16.20 @ 03:44)    COVID-19 PCR: Detected: This test has been validated by [facility] to be accurate;  though it has not been FDA cleared/approved by the usual pathway  As with all laboratory test, results should be correlated with clinical  findings.  https://www.fda.gov/media/812860/download  https://www.fda.gov/media/769681/download      MICROBIOLOGY:    RADIOLOGY & ADDITIONAL STUDIES:    CXR:  < from: Xray Chest 1 View AP/PA (04.16.20 @ 03:57) >  IMPRESSION:    Bibasilar airspace opacities which may represent atelectasis or infiltrate. Blunting of the right lateral costophrenic angle which may represent a small effusion.    < end of copied text >    Ct scan chest:    ekg;    echo:

## 2020-04-17 NOTE — RAPID RESPONSE TEAM SUMMARY - NSSITUATIONBACKGROUNDRRT_GEN_ALL_CORE
74 y.o with no PMH presents to ED with abd pain, endorses nausea, vomiting, diarrhea multiple episodes prior to arrival. Patient admitted for pneumonia and suspicion for COVID-19 infection. RRT called at approximately 2:30 AM due to sudden-onset dizziness and noted hypotension to 80/40 noted by nursing as well as bradycardia to 30s. Patient seen and examined at bedside, noted to appear uncomfortable however verbally responsive, alert and able to follow commands. No focal neurologic deficits noted on exam. Patient states he has not lost consciousness however feels incredibly dizzy, denies all ROS, better when laid flat. No diarrheal episodes upon arrival as per nursing.     1.5L NS bolus ordered and run due to hypotension, will reassess blood pressure. Noted that patient has only received 500cc bolus in ED at the time of admission.    EKG performed which shows NSR no significant ST-T wave changes.    Will reassess hypotension via repeat vital signs check after completion of 1.5L NS bolus.     Primary team to follow-up morning labs.

## 2020-04-18 LAB
ALBUMIN SERPL ELPH-MCNC: 2.7 G/DL — LOW (ref 3.5–5)
ALP SERPL-CCNC: 116 U/L — SIGNIFICANT CHANGE UP (ref 40–120)
ALT FLD-CCNC: 31 U/L DA — SIGNIFICANT CHANGE UP (ref 10–60)
ANION GAP SERPL CALC-SCNC: 10 MMOL/L — SIGNIFICANT CHANGE UP (ref 5–17)
AST SERPL-CCNC: 44 U/L — HIGH (ref 10–40)
BILIRUB SERPL-MCNC: 0.8 MG/DL — SIGNIFICANT CHANGE UP (ref 0.2–1.2)
BUN SERPL-MCNC: 7 MG/DL — SIGNIFICANT CHANGE UP (ref 7–18)
CALCIUM SERPL-MCNC: 8.5 MG/DL — SIGNIFICANT CHANGE UP (ref 8.4–10.5)
CHLORIDE SERPL-SCNC: 97 MMOL/L — SIGNIFICANT CHANGE UP (ref 96–108)
CO2 SERPL-SCNC: 26 MMOL/L — SIGNIFICANT CHANGE UP (ref 22–31)
CREAT SERPL-MCNC: 1.3 MG/DL — SIGNIFICANT CHANGE UP (ref 0.5–1.3)
CRP SERPL-MCNC: 12.95 MG/DL — HIGH (ref 0–0.4)
GLUCOSE SERPL-MCNC: 182 MG/DL — HIGH (ref 70–99)
HCT VFR BLD CALC: 37.3 % — LOW (ref 39–50)
HGB BLD-MCNC: 12.2 G/DL — LOW (ref 13–17)
MCHC RBC-ENTMCNC: 28.1 PG — SIGNIFICANT CHANGE UP (ref 27–34)
MCHC RBC-ENTMCNC: 32.7 GM/DL — SIGNIFICANT CHANGE UP (ref 32–36)
MCV RBC AUTO: 85.9 FL — SIGNIFICANT CHANGE UP (ref 80–100)
NRBC # BLD: 0 /100 WBCS — SIGNIFICANT CHANGE UP (ref 0–0)
PLATELET # BLD AUTO: 112 K/UL — LOW (ref 150–400)
POTASSIUM SERPL-MCNC: 4.1 MMOL/L — SIGNIFICANT CHANGE UP (ref 3.5–5.3)
POTASSIUM SERPL-SCNC: 4.1 MMOL/L — SIGNIFICANT CHANGE UP (ref 3.5–5.3)
PROT SERPL-MCNC: 7 G/DL — SIGNIFICANT CHANGE UP (ref 6–8.3)
RBC # BLD: 4.34 M/UL — SIGNIFICANT CHANGE UP (ref 4.2–5.8)
RBC # FLD: 12.3 % — SIGNIFICANT CHANGE UP (ref 10.3–14.5)
SODIUM SERPL-SCNC: 133 MMOL/L — LOW (ref 135–145)
WBC # BLD: 6.64 K/UL — SIGNIFICANT CHANGE UP (ref 3.8–10.5)
WBC # FLD AUTO: 6.64 K/UL — SIGNIFICANT CHANGE UP (ref 3.8–10.5)

## 2020-04-18 RX ADMIN — Medication 400 MILLIGRAM(S): at 11:18

## 2020-04-18 RX ADMIN — ENOXAPARIN SODIUM 40 MILLIGRAM(S): 100 INJECTION SUBCUTANEOUS at 11:18

## 2020-04-18 RX ADMIN — ONDANSETRON 4 MILLIGRAM(S): 8 TABLET, FILM COATED ORAL at 21:50

## 2020-04-18 RX ADMIN — Medication 650 MILLIGRAM(S): at 09:59

## 2020-04-18 NOTE — PROGRESS NOTE ADULT - ASSESSMENT
1.  PNA  - Likely 2nd to Covid-19.   - Covid-19 PCR positive   - CXR noted; ? small effusion    Hold diuretics given CARITO.   - Continue Plaquenil   - Continue Vit C, Thiamine, Zinc   - Robitussin PRN for cough  - Tylenol PRN for temp  - Oxygen supplement   - Taper off O2 as tolerate per patient   - Monitor labs  - Monitor VS. temp and SpO2.   - DVT and GI PPX   - F/U CXR.   - Albuterol MDI - PRN  - Isolation Precautions  - Intermittent prone positioning as tolerated by pt     2. Dizziness  - S/P RRT with hypotension and bradycardia  - Continue IVF  - Monitor VS.     3. CARITO   - Likely 2nd to dehydration   - Continue IVF  - Monitor labs.   - Avoid nephrotoxic drugs. 1.  PNA  - Likely 2nd to Covid-19.   - Covid-19 PCR positive   - CXR noted; ? small effusion    Hold diuretics given CARITO.   - Continue Plaquenil   - Continue Vit C, Thiamine, Zinc   - Robitussin PRN for cough  - Tylenol PRN for temp  - Oxygen supplement   - Taper off O2 as tolerate per patient   - Check LDH, LFTs, Ferritin, CRP, D-Dimer and procalcitonin  - Monitor VS. temp and SpO2.   - DVT and GI PPX   - F/U CXR.   - Albuterol MDI - PRN  - Isolation Precautions  - Intermittent prone positioning as tolerated by pt     2. Dizziness  - S/P RRT with hypotension and bradycardia  - Continue IVF  - Monitor VS.     3. CARITO   - Likely 2nd to dehydration   - Continue IVF  - Monitor labs.   - Avoid nephrotoxic drugs.

## 2020-04-18 NOTE — PROGRESS NOTE ADULT - SUBJECTIVE AND OBJECTIVE BOX
Patient is a 74y old  Male who presents with a chief complaint of Abdominal pain (18 Apr 2020 06:15)    Pt is awake, alert, lying in bed. Still SOB and with cough. Some diarrhea.     INTERVAL HPI/OVERNIGHT EVENTS:      VITAL SIGNS:  T(F): 100.5 (04-18-20 @ 09:56)  HR: 79 (04-18-20 @ 09:56)  BP: 127/56 (04-18-20 @ 09:56)  RR: 24 (04-18-20 @ 09:56)  SpO2: 99% (04-18-20 @ 09:56)  Wt(kg): --  I&O's Detail          REVIEW OF SYSTEMS:    CONSTITUTIONAL:  No fevers, chills, sweats    HEENT:  Eyes:  No diplopia or blurred vision. ENT:  No earache, sore throat or runny nose.    CARDIOVASCULAR:  No pressure, squeezing, tightness, or heaviness about the chest; no palpitations.    RESPIRATORY:  Per HPI    GASTROINTESTINAL:  No abdominal pain, nausea, vomiting or diarrhea.    GENITOURINARY:  No dysuria, frequency or urgency.    NEUROLOGIC:  No paresthesias, fasciculations, seizures or weakness.    PSYCHIATRIC:  No disorder of thought or mood.      PHYSICAL EXAM:    Constitutional: Well developed and nourished  Eyes:Perrla  ENMT: normal  Neck:supple  Respiratory: good air entry  Cardiovascular: S1 S2 regular  Gastrointestinal: Soft, Non tender  Extremities: No edema  Vascular:normal  Neurological:Awake, alert,Ox3  Musculoskeletal:Normal      MEDICATIONS  (STANDING):  enoxaparin Injectable 40 milliGRAM(s) SubCutaneous daily  hydroxychloroquine   Oral   hydroxychloroquine 400 milliGRAM(s) Oral every 24 hours  lactated ringers. 1000 milliLiter(s) (60 mL/Hr) IV Continuous <Continuous>    MEDICATIONS  (PRN):  acetaminophen   Tablet .. 650 milliGRAM(s) Oral every 6 hours PRN Temp greater or equal to 38C (100.4F), Mild Pain (1 - 3)  ALBUTerol    90 MICROgram(s) HFA Inhaler 2 Puff(s) Inhalation every 4 hours PRN Shortness of Breath and/or Wheezing  guaifenesin/dextromethorphan  Syrup 10 milliLiter(s) Oral every 4 hours PRN Cough  ondansetron Injectable 4 milliGRAM(s) IV Push every 6 hours PRN Nausea and/or Vomiting      Allergies    No Known Allergies    Intolerances        LABS:                        12.2   6.64  )-----------( 112      ( 18 Apr 2020 06:17 )             37.3     04-18    133<L>  |  97  |  7   ----------------------------<  182<H>  4.1   |  26  |  1.30    Ca    8.5      18 Apr 2020 06:17  Phos  3.8     04-16  Mg     1.9     04-16    TPro  7.0  /  Alb  2.7<L>  /  TBili  0.8  /  DBili  x   /  AST  44<H>  /  ALT  31  /  AlkPhos  116  04-18    PT/INR - ( 16 Apr 2020 14:32 )   PT: 12.8 sec;   INR: 1.13 ratio         PTT - ( 16 Apr 2020 14:32 )  PTT:29.8 sec      CARDIAC MARKERS ( 16 Apr 2020 15:26 )  <0.015 ng/mL / x     / 238 U/L / x     / x          CAPILLARY BLOOD GLUCOSE        pro-bnp -- 04-16 @ 14:32     d-dimer 228  04-16 @ 14:32  pro-bnp -- 04-16 @ 03:45     d-dimer 191  04-16 @ 03:45    COVID-19 PCR . (04.16.20 @ 03:44)    COVID-19 PCR: Detected: This test has been validated by [employer] to be accurate;  though it has not been FDA cleared/approved by the usual pathway  As with all laboratory test, results should be correlated with clinical  findings.  https://www.fda.gov/media/647066/download  https://www.fda.gov/media/759802/download      MICROBIOLOGY:    RADIOLOGY & ADDITIONAL STUDIES:    CXR:  < from: Xray Chest 1 View AP/PA (04.16.20 @ 03:57) >  IMPRESSION:    Bibasilar airspace opacities which may represent atelectasis or infiltrate. Blunting of the right lateral costophrenic angle which may represent a small effusion.    < end of copied text >    Ct scan chest:    ekg;    echo: Patient is a 74y old  Male who presents with a chief complaint of Abdominal pain (18 Apr 2020 06:15)    Pt is awake, alert, lying in bed. Still SOB and with cough. Some diarrhea. Saturating 99% on NC    INTERVAL HPI/OVERNIGHT EVENTS:      VITAL SIGNS:  T(F): 100.5 (04-18-20 @ 09:56)  HR: 79 (04-18-20 @ 09:56)  BP: 127/56 (04-18-20 @ 09:56)  RR: 24 (04-18-20 @ 09:56)  SpO2: 99% (04-18-20 @ 09:56)  Wt(kg): --  I&O's Detail          REVIEW OF SYSTEMS:    CONSTITUTIONAL:  No fevers, chills, sweats    HEENT:  Eyes:  No diplopia or blurred vision. ENT:  No earache, sore throat or runny nose.    CARDIOVASCULAR:  No pressure, squeezing, tightness, or heaviness about the chest; no palpitations.    RESPIRATORY:  Per HPI    GASTROINTESTINAL:  No abdominal pain, nausea, vomiting or diarrhea.    GENITOURINARY:  No dysuria, frequency or urgency.    NEUROLOGIC:  No paresthesias, fasciculations, seizures or weakness.    PSYCHIATRIC:  No disorder of thought or mood.      PHYSICAL EXAM:    Constitutional: Well developed and nourished  Eyes:Perrla  ENMT: normal  Neck:supple  Respiratory: good air entry  Cardiovascular: S1 S2 regular  Gastrointestinal: Soft, Non tender  Extremities: No edema  Vascular:normal  Neurological:Awake, alert,Ox3  Musculoskeletal:Normal      MEDICATIONS  (STANDING):  enoxaparin Injectable 40 milliGRAM(s) SubCutaneous daily  hydroxychloroquine   Oral   hydroxychloroquine 400 milliGRAM(s) Oral every 24 hours  lactated ringers. 1000 milliLiter(s) (60 mL/Hr) IV Continuous <Continuous>    MEDICATIONS  (PRN):  acetaminophen   Tablet .. 650 milliGRAM(s) Oral every 6 hours PRN Temp greater or equal to 38C (100.4F), Mild Pain (1 - 3)  ALBUTerol    90 MICROgram(s) HFA Inhaler 2 Puff(s) Inhalation every 4 hours PRN Shortness of Breath and/or Wheezing  guaifenesin/dextromethorphan  Syrup 10 milliLiter(s) Oral every 4 hours PRN Cough  ondansetron Injectable 4 milliGRAM(s) IV Push every 6 hours PRN Nausea and/or Vomiting      Allergies    No Known Allergies    Intolerances        LABS:                        12.2   6.64  )-----------( 112      ( 18 Apr 2020 06:17 )             37.3     04-18    133<L>  |  97  |  7   ----------------------------<  182<H>  4.1   |  26  |  1.30    Ca    8.5      18 Apr 2020 06:17  Phos  3.8     04-16  Mg     1.9     04-16    TPro  7.0  /  Alb  2.7<L>  /  TBili  0.8  /  DBili  x   /  AST  44<H>  /  ALT  31  /  AlkPhos  116  04-18    PT/INR - ( 16 Apr 2020 14:32 )   PT: 12.8 sec;   INR: 1.13 ratio         PTT - ( 16 Apr 2020 14:32 )  PTT:29.8 sec      CARDIAC MARKERS ( 16 Apr 2020 15:26 )  <0.015 ng/mL / x     / 238 U/L / x     / x          CAPILLARY BLOOD GLUCOSE        pro-bnp -- 04-16 @ 14:32     d-dimer 228  04-16 @ 14:32  pro-bnp -- 04-16 @ 03:45     d-dimer 191  04-16 @ 03:45    COVID-19 PCR . (04.16.20 @ 03:44)    COVID-19 PCR: Detected: This test has been validated by [employer] to be accurate;  though it has not been FDA cleared/approved by the usual pathway  As with all laboratory test, results should be correlated with clinical  findings.  https://www.fda.gov/media/785917/download  https://www.fda.gov/media/061235/download      MICROBIOLOGY:    RADIOLOGY & ADDITIONAL STUDIES:    CXR:  < from: Xray Chest 1 View AP/PA (04.16.20 @ 03:57) >  IMPRESSION:    Bibasilar airspace opacities which may represent atelectasis or infiltrate. Blunting of the right lateral costophrenic angle which may represent a small effusion.    < end of copied text >    Ct scan chest:    ekg;    echo:

## 2020-04-18 NOTE — PROGRESS NOTE ADULT - SUBJECTIVE AND OBJECTIVE BOX
Patient is a 74y old  Male who presents with a chief complaint of Abdominal pain (17 Apr 2020 13:45)    pt seen in icu [  ], reg med floor [  x ], bed [ x ], chair at bedside [   ], a+o x3 [x], lethargic [  ],    nad [ x ]      Allergies    No Known Allergies        Vitals    T(F): 99.8 (04-18-20 @ 05:48), Max: 101.9 (04-17-20 @ 21:23)  HR: 76 (04-18-20 @ 05:48) (68 - 89)  BP: 127/61 (04-18-20 @ 05:48) (112/59 - 136/58)  RR: 17 (04-18-20 @ 05:48) (17 - 22)  SpO2: 100% (04-18-20 @ 05:48) (98% - 100%)  Wt(kg): --  CAPILLARY BLOOD GLUCOSE          Labs                          11.3   5.72  )-----------( 96       ( 17 Apr 2020 05:58 )             34.6       04-17    136  |  104  |  10  ----------------------------<  172<H>  4.3   |  25  |  1.32<H>    Ca    7.9<L>      17 Apr 2020 05:58  Phos  3.8     04-16  Mg     1.9     04-16    TPro  6.5  /  Alb  2.6<L>  /  TBili  0.6  /  DBili  x   /  AST  30  /  ALT  24  /  AlkPhos  84  04-17      CARDIAC MARKERS ( 16 Apr 2020 15:26 )  <0.015 ng/mL / x     / 238 U/L / x     / x                Radiology Results      Meds    MEDICATIONS  (STANDING):  enoxaparin Injectable 40 milliGRAM(s) SubCutaneous daily  hydroxychloroquine   Oral   hydroxychloroquine 400 milliGRAM(s) Oral every 24 hours  lactated ringers. 1000 milliLiter(s) (60 mL/Hr) IV Continuous <Continuous>      MEDICATIONS  (PRN):  acetaminophen   Tablet .. 650 milliGRAM(s) Oral every 6 hours PRN Temp greater or equal to 38C (100.4F), Mild Pain (1 - 3)  ALBUTerol    90 MICROgram(s) HFA Inhaler 2 Puff(s) Inhalation every 4 hours PRN Shortness of Breath and/or Wheezing  guaifenesin/dextromethorphan  Syrup 10 milliLiter(s) Oral every 4 hours PRN Cough  ondansetron Injectable 4 milliGRAM(s) IV Push every 6 hours PRN Nausea and/or Vomiting      Physical Exam    Neuro :  no focal deficits  Respiratory: CTA B/L  CV: RRR, S1S2, no murmurs,   Abdominal: Soft, NT, ND +BS,  Extremities: No edema, + peripheral pulses    ASSESSMENT    pneumonia 2nd to covid- 19,   hyponatremia,   saida likely 2nd to diarrhea 2nd to covid-19    hypotension likely 2nd to dehydration      PLAN      cont albuterol inhaler  pulm f/u  afebrile   tmx 100.4   O2 sat on 2L n/c 98% ( range 97% - 99%)  cont O2 via n/c as needed   covid test positive  cont plaquenil day 2/5  contact and airborne isolation  D-dimer, crp, ferritin noted above  cont supportive care with tylenol prn, robitussin prn   cont ivf  f/u stool studies  f/u bmp  cont current meds Patient is a 74y old  Male who presents with a chief complaint of Abdominal pain (17 Apr 2020 13:45)    pt seen in icu [  ], reg med floor [  x ], bed [ x ], chair at bedside [   ], a+o x3 [x], lethargic [  ],    nad [ x ]      Allergies    No Known Allergies        Vitals    T(F): 99.8 (04-18-20 @ 05:48), Max: 101.9 (04-17-20 @ 21:23)  HR: 76 (04-18-20 @ 05:48) (68 - 89)  BP: 127/61 (04-18-20 @ 05:48) (112/59 - 136/58)  RR: 17 (04-18-20 @ 05:48) (17 - 22)  SpO2: 100% (04-18-20 @ 05:48) (98% - 100%)  Wt(kg): --  CAPILLARY BLOOD GLUCOSE          Labs                          11.3   5.72  )-----------( 96       ( 17 Apr 2020 05:58 )             34.6       04-17    136  |  104  |  10  ----------------------------<  172<H>  4.3   |  25  |  1.32<H>    Ca    7.9<L>      17 Apr 2020 05:58  Phos  3.8     04-16  Mg     1.9     04-16    TPro  6.5  /  Alb  2.6<L>  /  TBili  0.6  /  DBili  x   /  AST  30  /  ALT  24  /  AlkPhos  84  04-17      CARDIAC MARKERS ( 16 Apr 2020 15:26 )  <0.015 ng/mL / x     / 238 U/L / x     / x                Radiology Results      Meds    MEDICATIONS  (STANDING):  enoxaparin Injectable 40 milliGRAM(s) SubCutaneous daily  hydroxychloroquine   Oral   hydroxychloroquine 400 milliGRAM(s) Oral every 24 hours  lactated ringers. 1000 milliLiter(s) (60 mL/Hr) IV Continuous <Continuous>      MEDICATIONS  (PRN):  acetaminophen   Tablet .. 650 milliGRAM(s) Oral every 6 hours PRN Temp greater or equal to 38C (100.4F), Mild Pain (1 - 3)  ALBUTerol    90 MICROgram(s) HFA Inhaler 2 Puff(s) Inhalation every 4 hours PRN Shortness of Breath and/or Wheezing  guaifenesin/dextromethorphan  Syrup 10 milliLiter(s) Oral every 4 hours PRN Cough  ondansetron Injectable 4 milliGRAM(s) IV Push every 6 hours PRN Nausea and/or Vomiting      Physical Exam    Neuro :  no focal deficits  Respiratory: CTA B/L  CV: RRR, S1S2, no murmurs,   Abdominal: Soft, NT, ND +BS,  Extremities: No edema, + peripheral pulses    ASSESSMENT    pneumonia 2nd to covid- 19,   hyponatremia,   saida likely 2nd to diarrhea 2nd to covid-19    hypotension likely 2nd to dehydration      PLAN      cont albuterol inhaler  pulm f/u  t 99.8  tmx 101.9   O2 sat on 2L n/c 100% ( range 98% - 100%)  cont O2 via n/c as needed   add incentive spirometer  covid test positive  cont plaquenil day 3/5  contact and airborne isolation  D-dimer, crp, ferritin noted   cont supportive care with tylenol prn, robitussin prn   cont ivf  serum creat wnl  f/u stool studies  cont current meds

## 2020-04-19 ENCOUNTER — TRANSCRIPTION ENCOUNTER (OUTPATIENT)
Age: 75
End: 2020-04-19

## 2020-04-19 LAB
ALBUMIN SERPL ELPH-MCNC: 2.8 G/DL — LOW (ref 3.5–5)
ALP SERPL-CCNC: 184 U/L — HIGH (ref 40–120)
ALT FLD-CCNC: 46 U/L DA — SIGNIFICANT CHANGE UP (ref 10–60)
ANION GAP SERPL CALC-SCNC: 7 MMOL/L — SIGNIFICANT CHANGE UP (ref 5–17)
AST SERPL-CCNC: 64 U/L — HIGH (ref 10–40)
BILIRUB SERPL-MCNC: 1.1 MG/DL — SIGNIFICANT CHANGE UP (ref 0.2–1.2)
BUN SERPL-MCNC: 12 MG/DL — SIGNIFICANT CHANGE UP (ref 7–18)
CALCIUM SERPL-MCNC: 9 MG/DL — SIGNIFICANT CHANGE UP (ref 8.4–10.5)
CHLORIDE SERPL-SCNC: 97 MMOL/L — SIGNIFICANT CHANGE UP (ref 96–108)
CO2 SERPL-SCNC: 29 MMOL/L — SIGNIFICANT CHANGE UP (ref 22–31)
CREAT SERPL-MCNC: 1.33 MG/DL — HIGH (ref 0.5–1.3)
CRP SERPL-MCNC: 17.46 MG/DL — HIGH (ref 0–0.4)
CULTURE RESULTS: SIGNIFICANT CHANGE UP
GLUCOSE SERPL-MCNC: 206 MG/DL — HIGH (ref 70–99)
HCT VFR BLD CALC: 37.9 % — LOW (ref 39–50)
HGB BLD-MCNC: 12.5 G/DL — LOW (ref 13–17)
MCHC RBC-ENTMCNC: 28.8 PG — SIGNIFICANT CHANGE UP (ref 27–34)
MCHC RBC-ENTMCNC: 33 GM/DL — SIGNIFICANT CHANGE UP (ref 32–36)
MCV RBC AUTO: 87.3 FL — SIGNIFICANT CHANGE UP (ref 80–100)
NRBC # BLD: 0 /100 WBCS — SIGNIFICANT CHANGE UP (ref 0–0)
PLATELET # BLD AUTO: 160 K/UL — SIGNIFICANT CHANGE UP (ref 150–400)
POTASSIUM SERPL-MCNC: 4.9 MMOL/L — SIGNIFICANT CHANGE UP (ref 3.5–5.3)
POTASSIUM SERPL-SCNC: 4.9 MMOL/L — SIGNIFICANT CHANGE UP (ref 3.5–5.3)
PROT SERPL-MCNC: 7.6 G/DL — SIGNIFICANT CHANGE UP (ref 6–8.3)
RBC # BLD: 4.34 M/UL — SIGNIFICANT CHANGE UP (ref 4.2–5.8)
RBC # FLD: 12.2 % — SIGNIFICANT CHANGE UP (ref 10.3–14.5)
SODIUM SERPL-SCNC: 133 MMOL/L — LOW (ref 135–145)
SPECIMEN SOURCE: SIGNIFICANT CHANGE UP
WBC # BLD: 9.11 K/UL — SIGNIFICANT CHANGE UP (ref 3.8–10.5)
WBC # FLD AUTO: 9.11 K/UL — SIGNIFICANT CHANGE UP (ref 3.8–10.5)

## 2020-04-19 RX ADMIN — ENOXAPARIN SODIUM 40 MILLIGRAM(S): 100 INJECTION SUBCUTANEOUS at 11:53

## 2020-04-19 RX ADMIN — ONDANSETRON 4 MILLIGRAM(S): 8 TABLET, FILM COATED ORAL at 14:23

## 2020-04-19 RX ADMIN — Medication 400 MILLIGRAM(S): at 11:53

## 2020-04-19 RX ADMIN — Medication 650 MILLIGRAM(S): at 16:51

## 2020-04-19 NOTE — DISCHARGE NOTE PROVIDER - CARE PROVIDER_API CALL
Clyde Lee)  Internal Medicine  [address] [place] [address]  Phone: [phone]  Fax: [phone]  Follow Up Time:

## 2020-04-19 NOTE — DISCHARGE NOTE PROVIDER - NSDCFUADDINST_GEN_ALL_CORE_FT
CORONAVIRUS INSTRUCTIONS:   Based on your current clinical status and stability, it has been determined that you no longer need hospitalization and can recover while remaining in self-quarantine at home. You should follow the prevention steps below until a healthcare provider or local or state health department says you can return to your normal activities.     1. You should restrict activities outside your home, except for getting medical care.   2. Do not go to work, school, or public areas.   3. Avoid using public transportation, ride-sharing, or taxis.   4. Separate yourself from other people and animals in your home as much as possible.  When you are around other people (e.g., sharing a room or vehicle) you should wear a facemask.  5. Wash your hands often with soap and water for at least 20 seconds, especially after blowing your nose, coughing, or sneezing; going to the bathroom; and before eating or preparing food.  6. Cover your mouth and nose with a tissue when you cough or sneeze. Throw used tissues in a lined trash can. Immediately wash your hands with soap and water for at least 20 seconds  7. High touch surfaces include counters, tabletops, doorknobs, bathroom fixtures, toilets, phones, keyboards, tablets, and bedside tables.  8. Avoid sharing dishes, drinking glasses, cups, eating utensils, towels, or bedding with other people or pets in your home. After using these items, they should be washed thoroughly with soap and water.  You are strongly advised to seek prompt medical attention if your illness worsens or you develop new symptoms like fever or difficulty breathing.   Please call  [phone] to speak with your discharging physician if you have any questions.

## 2020-04-19 NOTE — PROGRESS NOTE ADULT - ASSESSMENT
1.  PNA  - Likely 2nd to Covid-19.   - Covid-19 PCR positive   - CXR noted; ? small effusion    Hold diuretics given CARITO.   - Continue Plaquenil   - Continue Vit C, Thiamine, Zinc   - Robitussin PRN for cough  - Tylenol PRN for temp  - Oxygen supplement   - Taper off O2 as tolerate per patient   - Check LDH, LFTs, Ferritin, CRP, D-Dimer and procalcitonin  - Monitor VS. temp and SpO2.   - DVT and GI PPX   - F/U CXR.   - Albuterol MDI - PRN  - Isolation Precautions  - Intermittent prone positioning as tolerated by pt     2. Dizziness  - S/P RRT with hypotension and bradycardia  - Continue IVF  - Monitor VS.     3. CARITO   - Likely 2nd to dehydration   - Continue IVF  - Monitor labs.   - Avoid nephrotoxic drugs. 1.  PNA  - Likely 2nd to Covid-19.   - Covid-19 PCR positive   - CXR noted; ? small effusion    Hold diuretics given CARITO.   - Continue Plaquenil   - Continue Vit C, Thiamine, Zinc   - Robitussin PRN for cough  - Tylenol PRN for temp  - Oxygen supplement   - Taper off O2 as tolerate per patient   - Check LDH, LFTs, Ferritin, CRP, D-Dimer and procalcitonin  - Monitor VS. temp and SpO2.   - DVT and GI PPX   - F/U CXR.   - Albuterol MDI - PRN  - Isolation Precautions  - Intermittent prone positioning as tolerated by pt     2. Dizziness  - S/P RRT with hypotension and bradycardia  - Continue IVF  - Monitor VS.     3. CARITO   - Resolved  -Monitor BMP

## 2020-04-19 NOTE — PROGRESS NOTE ADULT - SUBJECTIVE AND OBJECTIVE BOX
Patient is a 74y old  Male who presents with a chief complaint of Abdominal pain (18 Apr 2020 10:41)    pt seen in icu [  ], reg med floor [  x ], bed [ x ], chair at bedside [   ], a+o x3 [x], lethargic [  ],    nad [ x ]      Allergies    No Known Allergies        Vitals    T(F): 99 (04-19-20 @ 04:51), Max: 100.5 (04-18-20 @ 09:56)  HR: 74 (04-19-20 @ 04:51) (71 - 84)  BP: 113/51 (04-19-20 @ 04:51) (113/51 - 135/62)  RR: 20 (04-19-20 @ 04:51) (20 - 24)  SpO2: 100% (04-19-20 @ 04:51) (98% - 100%)  Wt(kg): --  CAPILLARY BLOOD GLUCOSE          Labs                          12.2   6.64  )-----------( 112      ( 18 Apr 2020 06:17 )             37.3       04-18    133<L>  |  97  |  7   ----------------------------<  182<H>  4.1   |  26  |  1.30    Ca    8.5      18 Apr 2020 06:17    TPro  7.0  /  Alb  2.7<L>  /  TBili  0.8  /  DBili  x   /  AST  44<H>  /  ALT  31  /  AlkPhos  116  04-18                Radiology Results      Meds    MEDICATIONS  (STANDING):  enoxaparin Injectable 40 milliGRAM(s) SubCutaneous daily  hydroxychloroquine   Oral   hydroxychloroquine 400 milliGRAM(s) Oral every 24 hours  lactated ringers. 1000 milliLiter(s) (60 mL/Hr) IV Continuous <Continuous>      MEDICATIONS  (PRN):  acetaminophen   Tablet .. 650 milliGRAM(s) Oral every 6 hours PRN Temp greater or equal to 38C (100.4F), Mild Pain (1 - 3)  ALBUTerol    90 MICROgram(s) HFA Inhaler 2 Puff(s) Inhalation every 4 hours PRN Shortness of Breath and/or Wheezing  guaifenesin/dextromethorphan  Syrup 10 milliLiter(s) Oral every 4 hours PRN Cough  ondansetron Injectable 4 milliGRAM(s) IV Push every 6 hours PRN Nausea and/or Vomiting      Physical Exam    Neuro :  no focal deficits  Respiratory: CTA B/L  CV: RRR, S1S2, no murmurs,   Abdominal: Soft, NT, ND +BS,  Extremities: No edema, + peripheral pulses    ASSESSMENT    pneumonia 2nd to covid- 19,   hyponatremia,   saida likely 2nd to diarrhea 2nd to covid-19    hypotension likely 2nd to dehydration      PLAN      cont albuterol inhaler  pulm f/u  t 99.8  tmx 101.9   O2 sat on 2L n/c 100% ( range 98% - 100%)  cont O2 via n/c as needed   add incentive spirometer  covid test positive  cont plaquenil day 3/5  contact and airborne isolation  D-dimer, crp, ferritin noted   cont supportive care with tylenol prn, robitussin prn   cont ivf  serum creat wnl  f/u stool studies  cont current meds Patient is a 74y old  Male who presents with a chief complaint of Abdominal pain (18 Apr 2020 10:41)    pt seen in icu [  ], reg med floor [  x ], bed [ x ], chair at bedside [   ], a+o x3 [x], lethargic [  ],    nad [ x ]      Allergies    No Known Allergies        Vitals    T(F): 99 (04-19-20 @ 04:51), Max: 100.5 (04-18-20 @ 09:56)  HR: 74 (04-19-20 @ 04:51) (71 - 84)  BP: 113/51 (04-19-20 @ 04:51) (113/51 - 135/62)  RR: 20 (04-19-20 @ 04:51) (20 - 24)  SpO2: 100% (04-19-20 @ 04:51) (98% - 100%)  Wt(kg): --  CAPILLARY BLOOD GLUCOSE          Labs                          12.2   6.64  )-----------( 112      ( 18 Apr 2020 06:17 )             37.3       04-18    133<L>  |  97  |  7   ----------------------------<  182<H>  4.1   |  26  |  1.30    Ca    8.5      18 Apr 2020 06:17    TPro  7.0  /  Alb  2.7<L>  /  TBili  0.8  /  DBili  x   /  AST  44<H>  /  ALT  31  /  AlkPhos  116  04-18                Radiology Results      Meds    MEDICATIONS  (STANDING):  enoxaparin Injectable 40 milliGRAM(s) SubCutaneous daily  hydroxychloroquine   Oral   hydroxychloroquine 400 milliGRAM(s) Oral every 24 hours  lactated ringers. 1000 milliLiter(s) (60 mL/Hr) IV Continuous <Continuous>      MEDICATIONS  (PRN):  acetaminophen   Tablet .. 650 milliGRAM(s) Oral every 6 hours PRN Temp greater or equal to 38C (100.4F), Mild Pain (1 - 3)  ALBUTerol    90 MICROgram(s) HFA Inhaler 2 Puff(s) Inhalation every 4 hours PRN Shortness of Breath and/or Wheezing  guaifenesin/dextromethorphan  Syrup 10 milliLiter(s) Oral every 4 hours PRN Cough  ondansetron Injectable 4 milliGRAM(s) IV Push every 6 hours PRN Nausea and/or Vomiting      Physical Exam    Neuro :  no focal deficits  Respiratory: CTA B/L  CV: RRR, S1S2, no murmurs,   Abdominal: Soft, NT, ND +BS,  Extremities: No edema, + peripheral pulses    ASSESSMENT    pneumonia 2nd to covid- 19,   hyponatremia,   saida likely 2nd to diarrhea 2nd to covid-19    hypotension likely 2nd to dehydration      PLAN      cont albuterol inhaler  pulm f/u  t 99.  tmx 100.5   O2 sat on 2L n/c 100% ( range 98% - 100%)  cont O2 via n/c and taper as tolerated   cont incentive spirometer  covid test positive  cont plaquenil day 4/5  contact and airborne isolation  D-dimer, crp, ferritin noted   cont supportive care with tylenol prn, robitussin prn   cont ivf  serum creat wnl  f/u stool studies  cont current meds

## 2020-04-19 NOTE — DISCHARGE NOTE PROVIDER - NSDCCPCAREPLAN_GEN_ALL_CORE_FT
PRINCIPAL DISCHARGE DIAGNOSIS  Diagnosis: COVID-19 virus detected  Assessment and Plan of Treatment: CORONAVIRUS INSTRUCTIONS: Based on your current clinical status and stability, it has been determined that you no longer need hospitalization and can recover while remaining in self-quarantine at home. You should follow the prevention steps below until a healthcare provider or local or state health department says you can return to your normal activities. 1. You should restrict activities outside your home, except for getting medical care. 2. Do not go to work, school, or public areas. 3. Avoid using public transportation, ride-sharing, or taxis. 4. Separate yourself from other people and animals in your home as much as possible.  When you are around other people (e.g., sharing a room or vehicle) you should wear a facemask.  5. Wash your hands often with soap and water for at least 20 seconds, especially after blowing your nose, coughing, or sneezing; going to the bathroom; and before eating or preparing food.6. Cover your mouth and nose with a tissue when you cough or sneeze. Throw used tissues in a lined trash can. Immediately wash your hands with soap and water for at least 20 seconds7. High touch surfaces include counters, tabletops, doorknobs, bathroom fixtures, toilets, phones, keyboards, tablets, and bedside tables.8. Avoid sharing dishes, drinking glasses, cups, eating utensils, towels, or bedding with other people or pets in your home. After using these items, they should be washed thoroughly with soap and water.You are strongly advised to seek prompt medical attention if your illness worsens or you develop new symptoms like fever or difficulty breathing.      SECONDARY DISCHARGE DIAGNOSES  Diagnosis: Abdominal pain  Assessment and Plan of Treatment:

## 2020-04-19 NOTE — PROGRESS NOTE ADULT - SUBJECTIVE AND OBJECTIVE BOX
Patient is a 74y old  Male who presents with a chief complaint of Abdominal pain (19 Apr 2020 10:20)    Pt is awake, alert, lying in bed. Still SOB and with cough. Some diarrhea. Saturating 100% on NC.    INTERVAL HPI/OVERNIGHT EVENTS:      VITAL SIGNS:  T(F): 100.1 (04-19-20 @ 08:25)  HR: 75 (04-19-20 @ 08:25)  BP: 121/53 (04-19-20 @ 08:25)  RR: 22 (04-19-20 @ 08:25)  SpO2: 100% (04-19-20 @ 08:25)  Wt(kg): --  I&O's Detail          REVIEW OF SYSTEMS:    CONSTITUTIONAL:  No fevers, chills, sweats    HEENT:  Eyes:  No diplopia or blurred vision. ENT:  No earache, sore throat or runny nose.    CARDIOVASCULAR:  No pressure, squeezing, tightness, or heaviness about the chest; no palpitations.    RESPIRATORY:  Per HPI    GASTROINTESTINAL:  No abdominal pain, nausea, vomiting or diarrhea.    GENITOURINARY:  No dysuria, frequency or urgency.    NEUROLOGIC:  No paresthesias, fasciculations, seizures or weakness.    PSYCHIATRIC:  No disorder of thought or mood.      PHYSICAL EXAM:    Constitutional: Well developed and nourished  Eyes:Perrla  ENMT: normal  Neck:supple  Respiratory: good air entry  Cardiovascular: S1 S2 regular  Gastrointestinal: Soft, Non tender  Extremities: No edema  Vascular:normal  Neurological:Awake, alert,Ox3  Musculoskeletal:Normal      MEDICATIONS  (STANDING):  enoxaparin Injectable 40 milliGRAM(s) SubCutaneous daily  hydroxychloroquine   Oral   hydroxychloroquine 400 milliGRAM(s) Oral every 24 hours  lactated ringers. 1000 milliLiter(s) (60 mL/Hr) IV Continuous <Continuous>    MEDICATIONS  (PRN):  acetaminophen   Tablet .. 650 milliGRAM(s) Oral every 6 hours PRN Temp greater or equal to 38C (100.4F), Mild Pain (1 - 3)  ALBUTerol    90 MICROgram(s) HFA Inhaler 2 Puff(s) Inhalation every 4 hours PRN Shortness of Breath and/or Wheezing  guaifenesin/dextromethorphan  Syrup 10 milliLiter(s) Oral every 4 hours PRN Cough  ondansetron Injectable 4 milliGRAM(s) IV Push every 6 hours PRN Nausea and/or Vomiting      Allergies    No Known Allergies    Intolerances        LABS:                        12.2   6.64  )-----------( 112      ( 18 Apr 2020 06:17 )             37.3     04-18    133<L>  |  97  |  7   ----------------------------<  182<H>  4.1   |  26  |  1.30    Ca    8.5      18 Apr 2020 06:17    TPro  7.0  /  Alb  2.7<L>  /  TBili  0.8  /  DBili  x   /  AST  44<H>  /  ALT  31  /  AlkPhos  116  04-18              CAPILLARY BLOOD GLUCOSE        pro-bnp -- 04-16 @ 14:32     d-dimer 228  04-16 @ 14:32  pro-bnp -- 04-16 @ 03:45     d-dimer 191  04-16 @ 03:45    COVID-19 PCR . (04.16.20 @ 03:44)    COVID-19 PCR: Detected: This test has been validated by [employer] to be accurate;  though it has not been FDA cleared/approved by the usual pathway  As with all laboratory test, results should be correlated with clinical  findings.  https://www.fda.gov/media/743070/download  https://www.fda.gov/media/697706/download      MICROBIOLOGY:    RADIOLOGY & ADDITIONAL STUDIES:    CXR:  < from: Xray Chest 1 View AP/PA (04.16.20 @ 03:57) >  IMPRESSION:    Bibasilar airspace opacities which may represent atelectasis or infiltrate. Blunting of the right lateral costophrenic angle which may represent a small effusion.    < end of copied text >    Ct scan chest:    ekg;    echo: Patient is a 74y old  Male who presents with a chief complaint of Abdominal pain (19 Apr 2020 10:20)    Pt is awake, alert, lying in bed. Still SOB and with cough. Some diarrhea. Saturating 100% on NC. Feeling okay    INTERVAL HPI/OVERNIGHT EVENTS:      VITAL SIGNS:  T(F): 100.1 (04-19-20 @ 08:25)  HR: 75 (04-19-20 @ 08:25)  BP: 121/53 (04-19-20 @ 08:25)  RR: 22 (04-19-20 @ 08:25)  SpO2: 100% (04-19-20 @ 08:25)  Wt(kg): --  I&O's Detail          REVIEW OF SYSTEMS:    CONSTITUTIONAL:  No fevers, chills, sweats    HEENT:  Eyes:  No diplopia or blurred vision. ENT:  No earache, sore throat or runny nose.    CARDIOVASCULAR:  No pressure, squeezing, tightness, or heaviness about the chest; no palpitations.    RESPIRATORY:  Per HPI    GASTROINTESTINAL:  No abdominal pain, nausea, vomiting or diarrhea.    GENITOURINARY:  No dysuria, frequency or urgency.    NEUROLOGIC:  No paresthesias, fasciculations, seizures or weakness.    PSYCHIATRIC:  No disorder of thought or mood.      PHYSICAL EXAM:    Constitutional: Well developed and nourished  Eyes:Perrla  ENMT: normal  Neck:supple  Respiratory: good air entry  Cardiovascular: S1 S2 regular  Gastrointestinal: Soft, Non tender  Extremities: No edema  Vascular:normal  Neurological:Awake, alert,Ox3  Musculoskeletal:Normal      MEDICATIONS  (STANDING):  enoxaparin Injectable 40 milliGRAM(s) SubCutaneous daily  hydroxychloroquine   Oral   hydroxychloroquine 400 milliGRAM(s) Oral every 24 hours  lactated ringers. 1000 milliLiter(s) (60 mL/Hr) IV Continuous <Continuous>    MEDICATIONS  (PRN):  acetaminophen   Tablet .. 650 milliGRAM(s) Oral every 6 hours PRN Temp greater or equal to 38C (100.4F), Mild Pain (1 - 3)  ALBUTerol    90 MICROgram(s) HFA Inhaler 2 Puff(s) Inhalation every 4 hours PRN Shortness of Breath and/or Wheezing  guaifenesin/dextromethorphan  Syrup 10 milliLiter(s) Oral every 4 hours PRN Cough  ondansetron Injectable 4 milliGRAM(s) IV Push every 6 hours PRN Nausea and/or Vomiting      Allergies    No Known Allergies    Intolerances        LABS:                        12.2   6.64  )-----------( 112      ( 18 Apr 2020 06:17 )             37.3     04-18    133<L>  |  97  |  7   ----------------------------<  182<H>  4.1   |  26  |  1.30    Ca    8.5      18 Apr 2020 06:17    TPro  7.0  /  Alb  2.7<L>  /  TBili  0.8  /  DBili  x   /  AST  44<H>  /  ALT  31  /  AlkPhos  116  04-18              CAPILLARY BLOOD GLUCOSE        pro-bnp -- 04-16 @ 14:32     d-dimer 228  04-16 @ 14:32  pro-bnp -- 04-16 @ 03:45     d-dimer 191  04-16 @ 03:45    COVID-19 PCR . (04.16.20 @ 03:44)    COVID-19 PCR: Detected: This test has been validated by [employer] to be accurate;  though it has not been FDA cleared/approved by the usual pathway  As with all laboratory test, results should be correlated with clinical  findings.  https://www.fda.gov/media/421584/download  https://www.fda.gov/media/110462/download      MICROBIOLOGY:    RADIOLOGY & ADDITIONAL STUDIES:    CXR:  < from: Xray Chest 1 View AP/PA (04.16.20 @ 03:57) >  IMPRESSION:    Bibasilar airspace opacities which may represent atelectasis or infiltrate. Blunting of the right lateral costophrenic angle which may represent a small effusion.    < end of copied text >    Ct scan chest:    ekg;    echo:

## 2020-04-19 NOTE — DISCHARGE NOTE PROVIDER - HOSPITAL COURSE
74 y.o presenting with abd pain, endorses n, v, diarrhea. endorses abd pain as diffused. fever noted on arrival. denies recent travel, sick contact, cp, cough. (16 Apr 2020 11:46)    Patient admitted and found to be COVID19+. He was cleared for discharge when he was stable on room air, afebrile and his diarrhea had improved. 74 y.o presenting with abd pain, endorses n, v, diarrhea. endorses abd pain as diffused. fever noted on arrival. denies recent travel, sick contact, cp, cough. (16 Apr 2020 11:46)    Patient admitted and found to be COVID19+. He was cleared for discharge when he was stable on room air, afebrile and his diarrhea had improved.         CORONAVIRUS INSTRUCTIONS:     Based on your current clinical status and stability, it has been determined that you no longer need hospitalization and can recover while remaining in self-quarantine at home. You should follow the prevention steps below until a healthcare provider or local or state health department says you can return to your normal activities.         1. You should restrict activities outside your home, except for getting medical care.     2. Do not go to work, school, or public areas.     3. Avoid using public transportation, ride-sharing, or taxis.     4. Separate yourself from other people and animals in your home as much as possible.  When you are around other people (e.g., sharing a room or vehicle) you should wear a facemask.    5. Wash your hands often with soap and water for at least 20 seconds, especially after blowing your nose, coughing, or sneezing; going to the bathroom; and before eating or preparing food.    6. Cover your mouth and nose with a tissue when you cough or sneeze. Throw used tissues in a lined trash can. Immediately wash your hands with soap and water for at least 20 seconds    7. High touch surfaces include counters, tabletops, doorknobs, bathroom fixtures, toilets, phones, keyboards, tablets, and bedside tables.    8. Avoid sharing dishes, drinking glasses, cups, eating utensils, towels, or bedding with other people or pets in your home. After using these items, they should be washed thoroughly with soap and water.    You are strongly advised to seek prompt medical attention if your illness worsens or you develop new symptoms like fever or difficulty breathing.     Please call  [phone] to speak with your discharging physician if you have any questions.

## 2020-04-20 LAB
ALBUMIN SERPL ELPH-MCNC: 2.7 G/DL — LOW (ref 3.5–5)
ALP SERPL-CCNC: 207 U/L — HIGH (ref 40–120)
ALT FLD-CCNC: 52 U/L DA — SIGNIFICANT CHANGE UP (ref 10–60)
ANION GAP SERPL CALC-SCNC: 7 MMOL/L — SIGNIFICANT CHANGE UP (ref 5–17)
ANION GAP SERPL CALC-SCNC: 7 MMOL/L — SIGNIFICANT CHANGE UP (ref 5–17)
AST SERPL-CCNC: 63 U/L — HIGH (ref 10–40)
BILIRUB SERPL-MCNC: 1 MG/DL — SIGNIFICANT CHANGE UP (ref 0.2–1.2)
BUN SERPL-MCNC: 10 MG/DL — SIGNIFICANT CHANGE UP (ref 7–18)
BUN SERPL-MCNC: 11 MG/DL — SIGNIFICANT CHANGE UP (ref 7–18)
CALCIUM SERPL-MCNC: 9.1 MG/DL — SIGNIFICANT CHANGE UP (ref 8.4–10.5)
CALCIUM SERPL-MCNC: 9.3 MG/DL — SIGNIFICANT CHANGE UP (ref 8.4–10.5)
CHLORIDE SERPL-SCNC: 98 MMOL/L — SIGNIFICANT CHANGE UP (ref 96–108)
CHLORIDE SERPL-SCNC: 99 MMOL/L — SIGNIFICANT CHANGE UP (ref 96–108)
CO2 SERPL-SCNC: 28 MMOL/L — SIGNIFICANT CHANGE UP (ref 22–31)
CO2 SERPL-SCNC: 29 MMOL/L — SIGNIFICANT CHANGE UP (ref 22–31)
CREAT SERPL-MCNC: 1.24 MG/DL — SIGNIFICANT CHANGE UP (ref 0.5–1.3)
CREAT SERPL-MCNC: 1.38 MG/DL — HIGH (ref 0.5–1.3)
CRP SERPL-MCNC: 17.95 MG/DL — HIGH (ref 0–0.4)
CULTURE RESULTS: SIGNIFICANT CHANGE UP
ERYTHROCYTE [SEDIMENTATION RATE] IN BLOOD: 88 MM/HR — HIGH (ref 0–20)
FERRITIN SERPL-MCNC: 1201 NG/ML — HIGH (ref 30–400)
GLUCOSE SERPL-MCNC: 188 MG/DL — HIGH (ref 70–99)
GLUCOSE SERPL-MCNC: 207 MG/DL — HIGH (ref 70–99)
HCT VFR BLD CALC: 38.8 % — LOW (ref 39–50)
HGB BLD-MCNC: 12.8 G/DL — LOW (ref 13–17)
LDH SERPL L TO P-CCNC: 423 U/L — HIGH (ref 120–225)
MCHC RBC-ENTMCNC: 29.1 PG — SIGNIFICANT CHANGE UP (ref 27–34)
MCHC RBC-ENTMCNC: 33 GM/DL — SIGNIFICANT CHANGE UP (ref 32–36)
MCV RBC AUTO: 88.2 FL — SIGNIFICANT CHANGE UP (ref 80–100)
NRBC # BLD: 0 /100 WBCS — SIGNIFICANT CHANGE UP (ref 0–0)
PLATELET # BLD AUTO: 159 K/UL — SIGNIFICANT CHANGE UP (ref 150–400)
POTASSIUM SERPL-MCNC: 4.7 MMOL/L — SIGNIFICANT CHANGE UP (ref 3.5–5.3)
POTASSIUM SERPL-MCNC: 5.6 MMOL/L — HIGH (ref 3.5–5.3)
POTASSIUM SERPL-SCNC: 4.7 MMOL/L — SIGNIFICANT CHANGE UP (ref 3.5–5.3)
POTASSIUM SERPL-SCNC: 5.6 MMOL/L — HIGH (ref 3.5–5.3)
PROT SERPL-MCNC: 7.8 G/DL — SIGNIFICANT CHANGE UP (ref 6–8.3)
RBC # BLD: 4.4 M/UL — SIGNIFICANT CHANGE UP (ref 4.2–5.8)
RBC # FLD: 12.4 % — SIGNIFICANT CHANGE UP (ref 10.3–14.5)
SODIUM SERPL-SCNC: 134 MMOL/L — LOW (ref 135–145)
SODIUM SERPL-SCNC: 134 MMOL/L — LOW (ref 135–145)
SPECIMEN SOURCE: SIGNIFICANT CHANGE UP
WBC # BLD: 9.2 K/UL — SIGNIFICANT CHANGE UP (ref 3.8–10.5)
WBC # FLD AUTO: 9.2 K/UL — SIGNIFICANT CHANGE UP (ref 3.8–10.5)

## 2020-04-20 RX ORDER — METOCLOPRAMIDE HCL 10 MG
10 TABLET ORAL ONCE
Refills: 0 | Status: COMPLETED | OUTPATIENT
Start: 2020-04-20 | End: 2020-04-20

## 2020-04-20 RX ADMIN — ONDANSETRON 4 MILLIGRAM(S): 8 TABLET, FILM COATED ORAL at 13:23

## 2020-04-20 RX ADMIN — Medication 650 MILLIGRAM(S): at 05:30

## 2020-04-20 RX ADMIN — Medication 10 MILLIGRAM(S): at 14:35

## 2020-04-20 RX ADMIN — Medication 30 MILLILITER(S): at 01:28

## 2020-04-20 RX ADMIN — Medication 400 MILLIGRAM(S): at 11:32

## 2020-04-20 RX ADMIN — ENOXAPARIN SODIUM 40 MILLIGRAM(S): 100 INJECTION SUBCUTANEOUS at 11:32

## 2020-04-20 RX ADMIN — Medication 650 MILLIGRAM(S): at 13:26

## 2020-04-20 NOTE — PROGRESS NOTE ADULT - SUBJECTIVE AND OBJECTIVE BOX
Pt is awake, alert, lying in bed in NAD. Saturating 100%, 1L NC. Still with c/o generalized weakness.   INTERVAL HPI/OVERNIGHT EVENTS:      VITAL SIGNS:  T(F): 98.4 (04-20-20 @ 08:29)  HR: 62 (04-20-20 @ 08:29)  BP: 124/59 (04-20-20 @ 08:29)  RR: 21 (04-20-20 @ 08:29)  SpO2: 100% (04-20-20 @ 08:29)  Wt(kg): --  I&O's Detail    19 Apr 2020 07:01  -  20 Apr 2020 07:00  --------------------------------------------------------  IN:  Total IN: 0 mL    OUT:    Voided: 400 mL  Total OUT: 400 mL    Total NET: -400 mL              REVIEW OF SYSTEMS:    CONSTITUTIONAL:  No fevers, chills, sweats    HEENT:  Eyes:  No diplopia or blurred vision. ENT:  No earache, sore throat or runny nose.    CARDIOVASCULAR:  No pressure, squeezing, tightness, or heaviness about the chest; no palpitations.    RESPIRATORY:  Per HPI    GASTROINTESTINAL:  No abdominal pain, nausea, vomiting or diarrhea.    GENITOURINARY:  No dysuria, frequency or urgency.    NEUROLOGIC:  No paresthesias, fasciculations, seizures or weakness.    PSYCHIATRIC:  No disorder of thought or mood.      PHYSICAL EXAM:    Constitutional: Well developed and nourished  Eyes:Perrla  ENMT: normal  Neck:supple  Respiratory: good air entry  Cardiovascular: S1 S2 regular  Gastrointestinal: Soft, Non tender  Extremities: No edema  Vascular:normal  Neurological:Awake, alert,Ox3  Musculoskeletal:Normal      MEDICATIONS  (STANDING):  enoxaparin Injectable 40 milliGRAM(s) SubCutaneous daily    MEDICATIONS  (PRN):  acetaminophen   Tablet .. 650 milliGRAM(s) Oral every 6 hours PRN Temp greater or equal to 38C (100.4F), Mild Pain (1 - 3)  ALBUTerol    90 MICROgram(s) HFA Inhaler 2 Puff(s) Inhalation every 4 hours PRN Shortness of Breath and/or Wheezing  guaifenesin/dextromethorphan  Syrup 10 milliLiter(s) Oral every 4 hours PRN Cough  ondansetron Injectable 4 milliGRAM(s) IV Push every 6 hours PRN Nausea and/or Vomiting      Allergies    No Known Allergies    Intolerances        LABS:                        12.8   9.20  )-----------( 159      ( 20 Apr 2020 08:48 )             38.8     04-20    134<L>  |  99  |  10  ----------------------------<  188<H>  5.6<H>   |  28  |  1.38<H>    Ca    9.3      20 Apr 2020 08:48    TPro  7.8  /  Alb  2.7<L>  /  TBili  1.0  /  DBili  x   /  AST  63<H>  /  ALT  52  /  AlkPhos  207<H>  04-20              CAPILLARY BLOOD GLUCOSE        pro-bnp -- 04-16 @ 14:32     d-dimer 228  04-16 @ 14:32  pro-bnp -- 04-16 @ 03:45     d-dimer 191  04-16 @ 03:45      RADIOLOGY & ADDITIONAL TESTS:    CXR:    Ct scan chest:    ekg;    echo:

## 2020-04-20 NOTE — CHART NOTE - NSCHARTNOTEFT_GEN_A_CORE
Patient noted to have hyperkalemia of 5.6 on AM labs. BMP repeated and results below.     04-20    134<L>  |  98  |  11  ----------------------------<  207<H>  4.7   |  29  |  1.24    Ca    9.1      20 Apr 2020 16:09    TPro  7.8  /  Alb  2.7<L>  /  TBili  1.0  /  DBili  x   /  AST  63<H>  /  ALT  52  /  AlkPhos  207<H>  04-20    Hyperkalemia resolved. Will continue to trend labs.  Discussed with Dr. Lee

## 2020-04-20 NOTE — PROGRESS NOTE ADULT - SUBJECTIVE AND OBJECTIVE BOX
Patient is a 74y old  Male who presents with a chief complaint of Abdominal pain (19 Apr 2020 10:43)    pt seen in icu [  ], reg med floor [  x ], bed [ x ], chair at bedside [   ], a+o x3 [x], lethargic [  ],    nad [ x ]    Allergies    No Known Allergies        Vitals    T(F): 99.9 (04-20-20 @ 01:42), Max: 101.4 (04-19-20 @ 16:22)  HR: 77 (04-20-20 @ 01:42) (70 - 77)  BP: 138/67 (04-20-20 @ 01:42) (111/47 - 138/67)  RR: 20 (04-20-20 @ 01:42) (19 - 24)  SpO2: 99% (04-20-20 @ 01:42) (84% - 100%)  Wt(kg): --  CAPILLARY BLOOD GLUCOSE          Labs                          12.5   9.11  )-----------( 160      ( 19 Apr 2020 13:16 )             37.9       04-19    133<L>  |  97  |  12  ----------------------------<  206<H>  4.9   |  29  |  1.33<H>    Ca    9.0      19 Apr 2020 13:16    TPro  7.6  /  Alb  2.8<L>  /  TBili  1.1  /  DBili  x   /  AST  64<H>  /  ALT  46  /  AlkPhos  184<H>  04-19            .Stool rubi [place]  04-19 @ 14:10   GI PCR Results: NOT detected  *******Please Note:*******  GI panel PCR evaluates for:  Campylobacter, Plesiomonas shigelloides, Salmonella,  Vibrio, Yersinia enterocolitica, Enteroaggregative  Escherichia coli (EAEC), Enteropathogenic E.coli (EPEC),  Enterotoxigenic E. coli (ETEC) lt/st, Shiga-like  toxin-producing E. coli (STEC) stx1/stx2,  Shigella/ Enteroinvasive E. coli (EIEC), Cryptosporidium,  Cyclospora cayetanensis, Entamoeba histolytica,  Giardia lamblia, Adenovirus F 40/41, Astrovirus,  Norovirus GI/GII, Rotavirus A, Sapovirus  --  --          Radiology Results      Meds    MEDICATIONS  (STANDING):  enoxaparin Injectable 40 milliGRAM(s) SubCutaneous daily  hydroxychloroquine   Oral   hydroxychloroquine 400 milliGRAM(s) Oral every 24 hours      MEDICATIONS  (PRN):  acetaminophen   Tablet .. 650 milliGRAM(s) Oral every 6 hours PRN Temp greater or equal to 38C (100.4F), Mild Pain (1 - 3)  ALBUTerol    90 MICROgram(s) HFA Inhaler 2 Puff(s) Inhalation every 4 hours PRN Shortness of Breath and/or Wheezing  guaifenesin/dextromethorphan  Syrup 10 milliLiter(s) Oral every 4 hours PRN Cough  ondansetron Injectable 4 milliGRAM(s) IV Push every 6 hours PRN Nausea and/or Vomiting      Physical Exam    Neuro :  no focal deficits  Respiratory: CTA B/L  CV: RRR, S1S2, no murmurs,   Abdominal: Soft, NT, ND +BS,  Extremities: No edema, + peripheral pulses    ASSESSMENT    pneumonia 2nd to covid- 19,   hyponatremia,   saida likely 2nd to diarrhea 2nd to covid-19    hypotension likely 2nd to dehydration      PLAN      cont albuterol inhaler  pulm f/u  t 99.  tmx 100.5   O2 sat on 2L n/c 100% ( range 98% - 100%)  cont O2 via n/c and taper as tolerated   cont incentive spirometer  covid test positive  cont plaquenil day 4/5  contact and airborne isolation  D-dimer, crp, ferritin noted   cont supportive care with tylenol prn, robitussin prn   cont ivf  serum creat wnl  f/u stool studies  cont current meds Patient is a 74y old  Male who presents with a chief complaint of Abdominal pain (19 Apr 2020 10:43)    pt seen in icu [  ], reg med floor [  x ], bed [ x ], chair at bedside [   ], a+o x3 [x], lethargic [  ],    nad [ x ]    Allergies    No Known Allergies        Vitals    T(F): 99.9 (04-20-20 @ 01:42), Max: 101.4 (04-19-20 @ 16:22)  HR: 77 (04-20-20 @ 01:42) (70 - 77)  BP: 138/67 (04-20-20 @ 01:42) (111/47 - 138/67)  RR: 20 (04-20-20 @ 01:42) (19 - 24)  SpO2: 99% (04-20-20 @ 01:42) (84% - 100%)  Wt(kg): --  CAPILLARY BLOOD GLUCOSE          Labs                          12.5   9.11  )-----------( 160      ( 19 Apr 2020 13:16 )             37.9       04-19    133<L>  |  97  |  12  ----------------------------<  206<H>  4.9   |  29  |  1.33<H>    Ca    9.0      19 Apr 2020 13:16    TPro  7.6  /  Alb  2.8<L>  /  TBili  1.1  /  DBili  x   /  AST  64<H>  /  ALT  46  /  AlkPhos  184<H>  04-19            .Stool rubi [place]  04-19 @ 14:10   GI PCR Results: NOT detected  *******Please Note:*******  GI panel PCR evaluates for:  Campylobacter, Plesiomonas shigelloides, Salmonella,  Vibrio, Yersinia enterocolitica, Enteroaggregative  Escherichia coli (EAEC), Enteropathogenic E.coli (EPEC),  Enterotoxigenic E. coli (ETEC) lt/st, Shiga-like  toxin-producing E. coli (STEC) stx1/stx2,  Shigella/ Enteroinvasive E. coli (EIEC), Cryptosporidium,  Cyclospora cayetanensis, Entamoeba histolytica,  Giardia lamblia, Adenovirus F 40/41, Astrovirus,  Norovirus GI/GII, Rotavirus A, Sapovirus  --  --          Radiology Results      Meds    MEDICATIONS  (STANDING):  enoxaparin Injectable 40 milliGRAM(s) SubCutaneous daily  hydroxychloroquine   Oral   hydroxychloroquine 400 milliGRAM(s) Oral every 24 hours      MEDICATIONS  (PRN):  acetaminophen   Tablet .. 650 milliGRAM(s) Oral every 6 hours PRN Temp greater or equal to 38C (100.4F), Mild Pain (1 - 3)  ALBUTerol    90 MICROgram(s) HFA Inhaler 2 Puff(s) Inhalation every 4 hours PRN Shortness of Breath and/or Wheezing  guaifenesin/dextromethorphan  Syrup 10 milliLiter(s) Oral every 4 hours PRN Cough  ondansetron Injectable 4 milliGRAM(s) IV Push every 6 hours PRN Nausea and/or Vomiting      Physical Exam    Neuro :  no focal deficits  Respiratory: CTA B/L  CV: RRR, S1S2, no murmurs,   Abdominal: Soft, NT, ND +BS,  Extremities: No edema, + peripheral pulses    ASSESSMENT    pneumonia 2nd to covid- 19,   hyponatremia,   saida likely 2nd to diarrhea 2nd to covid-19    hypotension likely 2nd to dehydration      PLAN      cont albuterol inhaler  pulm f/u  t 99.9  tmx 101.4   O2 sat on 2L n/c 99% ( range 84% - 100%)  cont O2 via n/c and taper as tolerated   goal O2 92-96%  cont incentive spirometer  covid test positive  cont plaquenil day 5/5  contact and airborne isolation  D-dimer, crp, ferritin noted   cont supportive care with tylenol prn, robitussin prn   cont ivf  serum creat wnl  f/u stool studies  cont current meds Patient is a 74y old  Male who presents with a chief complaint of Abdominal pain (19 Apr 2020 10:43)    pt seen in icu [  ], reg med floor [  x ], bed [ x ], chair at bedside [   ], a+o x3 [x], lethargic [  ],    nad [ x ]    Allergies    No Known Allergies        Vitals    T(F): 99.9 (04-20-20 @ 01:42), Max: 101.4 (04-19-20 @ 16:22)  HR: 77 (04-20-20 @ 01:42) (70 - 77)  BP: 138/67 (04-20-20 @ 01:42) (111/47 - 138/67)  RR: 20 (04-20-20 @ 01:42) (19 - 24)  SpO2: 99% (04-20-20 @ 01:42) (84% - 100%)  Wt(kg): --  CAPILLARY BLOOD GLUCOSE          Labs                          12.5   9.11  )-----------( 160      ( 19 Apr 2020 13:16 )             37.9       04-19    133<L>  |  97  |  12  ----------------------------<  206<H>  4.9   |  29  |  1.33<H>    Ca    9.0      19 Apr 2020 13:16    TPro  7.6  /  Alb  2.8<L>  /  TBili  1.1  /  DBili  x   /  AST  64<H>  /  ALT  46  /  AlkPhos  184<H>  04-19            .Stool rubi [place]  04-19 @ 14:10   GI PCR Results: NOT detected  *******Please Note:*******  GI panel PCR evaluates for:  Campylobacter, Plesiomonas shigelloides, Salmonella,  Vibrio, Yersinia enterocolitica, Enteroaggregative  Escherichia coli (EAEC), Enteropathogenic E.coli (EPEC),  Enterotoxigenic E. coli (ETEC) lt/st, Shiga-like  toxin-producing E. coli (STEC) stx1/stx2,  Shigella/ Enteroinvasive E. coli (EIEC), Cryptosporidium,  Cyclospora cayetanensis, Entamoeba histolytica,  Giardia lamblia, Adenovirus F 40/41, Astrovirus,  Norovirus GI/GII, Rotavirus A, Sapovirus  --  --          Radiology Results      Meds    MEDICATIONS  (STANDING):  enoxaparin Injectable 40 milliGRAM(s) SubCutaneous daily  hydroxychloroquine   Oral   hydroxychloroquine 400 milliGRAM(s) Oral every 24 hours      MEDICATIONS  (PRN):  acetaminophen   Tablet .. 650 milliGRAM(s) Oral every 6 hours PRN Temp greater or equal to 38C (100.4F), Mild Pain (1 - 3)  ALBUTerol    90 MICROgram(s) HFA Inhaler 2 Puff(s) Inhalation every 4 hours PRN Shortness of Breath and/or Wheezing  guaifenesin/dextromethorphan  Syrup 10 milliLiter(s) Oral every 4 hours PRN Cough  ondansetron Injectable 4 milliGRAM(s) IV Push every 6 hours PRN Nausea and/or Vomiting      Physical Exam    Neuro :  no focal deficits  Respiratory: CTA B/L  CV: RRR, S1S2, no murmurs,   Abdominal: Soft, NT, ND +BS,  Extremities: No edema, + peripheral pulses    ASSESSMENT    pneumonia 2nd to covid- 19,   hyponatremia,   saida likely 2nd to diarrhea 2nd to covid-19    hypotension likely 2nd to dehydration      PLAN      cont albuterol inhaler  pulm f/u  t 99.9  tmx 101.4   O2 sat on 2L n/c 99% ( range 84% - 100%)  cont O2 via n/c and taper as tolerated   goal O2 92-96%  cont incentive spirometer  covid test positive  cont plaquenil day 5/5  contact and airborne isolation  D-dimer, crp, ferritin noted   cont supportive care with tylenol prn, robitussin prn   cont ivf  serum creat wnl  stool pcr neg noted above  phys tx re-eval  cont current meds

## 2020-04-20 NOTE — PROGRESS NOTE ADULT - ASSESSMENT
1.  PNA  - Likely 2nd to Covid-19.   - Covid-19 PCR positive   - CXR noted; ? small effusion    Hold diuretics given CARITO.   - Completed Plaquenil   - Continue Vit C, Thiamine, Zinc   - Robitussin PRN for cough  - Tylenol PRN for temp  - Oxygen supplement   - Taper off O2 as tolerate per patient   - Monitor VS. temp and SpO2.   - DVT and GI PPX   - F/U CXR.   - Albuterol MDI - PRN  - Isolation Precautions    2. Dizziness  - S/P RRT with hypotension and bradycardia  - Continue IVF  - Monitor VS.     3. CARITO   - Avoid nephrotoxic drugs   - Monitor BMP

## 2020-04-21 LAB
ANION GAP SERPL CALC-SCNC: 6 MMOL/L — SIGNIFICANT CHANGE UP (ref 5–17)
BUN SERPL-MCNC: 14 MG/DL — SIGNIFICANT CHANGE UP (ref 7–18)
CALCIUM SERPL-MCNC: 9.1 MG/DL — SIGNIFICANT CHANGE UP (ref 8.4–10.5)
CHLORIDE SERPL-SCNC: 100 MMOL/L — SIGNIFICANT CHANGE UP (ref 96–108)
CO2 SERPL-SCNC: 28 MMOL/L — SIGNIFICANT CHANGE UP (ref 22–31)
CREAT SERPL-MCNC: 1.15 MG/DL — SIGNIFICANT CHANGE UP (ref 0.5–1.3)
GLUCOSE SERPL-MCNC: 170 MG/DL — HIGH (ref 70–99)
HCT VFR BLD CALC: 38.4 % — LOW (ref 39–50)
HGB BLD-MCNC: 12.6 G/DL — LOW (ref 13–17)
MAGNESIUM SERPL-MCNC: 2.5 MG/DL — SIGNIFICANT CHANGE UP (ref 1.6–2.6)
MCHC RBC-ENTMCNC: 28.9 PG — SIGNIFICANT CHANGE UP (ref 27–34)
MCHC RBC-ENTMCNC: 32.8 GM/DL — SIGNIFICANT CHANGE UP (ref 32–36)
MCV RBC AUTO: 88.1 FL — SIGNIFICANT CHANGE UP (ref 80–100)
NRBC # BLD: 0 /100 WBCS — SIGNIFICANT CHANGE UP (ref 0–0)
PHOSPHATE SERPL-MCNC: 3.6 MG/DL — SIGNIFICANT CHANGE UP (ref 2.5–4.5)
PLATELET # BLD AUTO: 168 K/UL — SIGNIFICANT CHANGE UP (ref 150–400)
POTASSIUM SERPL-MCNC: 4.3 MMOL/L — SIGNIFICANT CHANGE UP (ref 3.5–5.3)
POTASSIUM SERPL-SCNC: 4.3 MMOL/L — SIGNIFICANT CHANGE UP (ref 3.5–5.3)
RBC # BLD: 4.36 M/UL — SIGNIFICANT CHANGE UP (ref 4.2–5.8)
RBC # FLD: 12.2 % — SIGNIFICANT CHANGE UP (ref 10.3–14.5)
SODIUM SERPL-SCNC: 134 MMOL/L — LOW (ref 135–145)
WBC # BLD: 7.55 K/UL — SIGNIFICANT CHANGE UP (ref 3.8–10.5)
WBC # FLD AUTO: 7.55 K/UL — SIGNIFICANT CHANGE UP (ref 3.8–10.5)

## 2020-04-21 RX ADMIN — ENOXAPARIN SODIUM 40 MILLIGRAM(S): 100 INJECTION SUBCUTANEOUS at 11:45

## 2020-04-21 RX ADMIN — Medication 650 MILLIGRAM(S): at 21:07

## 2020-04-21 RX ADMIN — Medication 650 MILLIGRAM(S): at 00:37

## 2020-04-21 RX ADMIN — Medication 30 MILLILITER(S): at 23:15

## 2020-04-21 RX ADMIN — ONDANSETRON 4 MILLIGRAM(S): 8 TABLET, FILM COATED ORAL at 13:47

## 2020-04-21 NOTE — PROGRESS NOTE ADULT - SUBJECTIVE AND OBJECTIVE BOX
Patient is a 74y old  Male who presents with a chief complaint of Abdominal pain (20 Apr 2020 12:17)    pt seen in icu [  ], reg med floor [  x ], bed [ x ], chair at bedside [   ], a+o x3 [x], lethargic [  ],    nad [ x ]      Allergies    No Known Allergies        Vitals    T(F): 98.5 (04-21-20 @ 00:52), Max: 99.7 (04-20-20 @ 16:22)  HR: 70 (04-21-20 @ 00:52) (62 - 80)  BP: 133/73 (04-21-20 @ 00:52) (124/59 - 140/66)  RR: 18 (04-21-20 @ 00:52) (18 - 22)  SpO2: 96% (04-21-20 @ 00:52) (91% - 100%)  Wt(kg): --  CAPILLARY BLOOD GLUCOSE          Labs                          12.8   9.20  )-----------( 159      ( 20 Apr 2020 08:48 )             38.8       04-20    134<L>  |  98  |  11  ----------------------------<  207<H>  4.7   |  29  |  1.24    Ca    9.1      20 Apr 2020 16:09    TPro  7.8  /  Alb  2.7<L>  /  TBili  1.0  /  DBili  x   /  AST  63<H>  /  ALT  52  /  AlkPhos  207<H>  04-20            .Stool para tanner  04-19 @ 14:11   No Protozoa seen by trichrome stain  No Helminths or Protozoa seen in formalin concentrate  performed by iodine stain  (routine O+P not evaluated for Microsporidia,  Cryptosporidia, Cyclospora, or Isospora.)  One negative sample does not necessarily rule  out the presence of a parasitic infection.  --  --      .Stool rubi rabia  04-19 @ 14:10   GI PCR Results: NOT detected  *******Please Note:*******  GI panel PCR evaluates for:  Campylobacter, Plesiomonas shigelloides, Salmonella,  Vibrio, Yersinia enterocolitica, Enteroaggregative  Escherichia coli (EAEC), Enteropathogenic E.coli (EPEC),  Enterotoxigenic E. coli (ETEC) lt/st, Shiga-like  toxin-producing E. coli (STEC) stx1/stx2,  Shigella/ Enteroinvasive E. coli (EIEC), Cryptosporidium,  Cyclospora cayetanensis, Entamoeba histolytica,  Giardia lamblia, Adenovirus F 40/41, Astrovirus,  Norovirus GI/GII, Rotavirus A, Sapovirus  --  --          Radiology Results      Meds    MEDICATIONS  (STANDING):  enoxaparin Injectable 40 milliGRAM(s) SubCutaneous daily      MEDICATIONS  (PRN):  acetaminophen   Tablet .. 650 milliGRAM(s) Oral every 6 hours PRN Temp greater or equal to 38C (100.4F), Mild Pain (1 - 3)  ALBUTerol    90 MICROgram(s) HFA Inhaler 2 Puff(s) Inhalation every 4 hours PRN Shortness of Breath and/or Wheezing  guaifenesin/dextromethorphan  Syrup 10 milliLiter(s) Oral every 4 hours PRN Cough  ondansetron Injectable 4 milliGRAM(s) IV Push every 6 hours PRN Nausea and/or Vomiting      Physical Exam    Neuro :  no focal deficits  Respiratory: CTA B/L  CV: RRR, S1S2, no murmurs,   Abdominal: Soft, NT, ND +BS,  Extremities: No edema, + peripheral pulses    ASSESSMENT    pneumonia 2nd to covid- 19,   hyponatremia,   saida likely 2nd to diarrhea 2nd to covid-19    hypotension likely 2nd to dehydration      PLAN      cont albuterol inhaler  pulm f/u  t 99.9  tmx 101.4   O2 sat on 2L n/c 99% ( range 84% - 100%)  cont O2 via n/c and taper as tolerated   goal O2 92-96%  cont incentive spirometer  covid test positive  cont plaquenil day 5/5  contact and airborne isolation  D-dimer, crp, ferritin noted   cont supportive care with tylenol prn, robitussin prn   cont ivf  serum creat wnl  stool pcr neg noted above  phys tx re-eval  cont current meds Patient is a 74y old  Male who presents with a chief complaint of Abdominal pain (20 Apr 2020 12:17)    pt seen in icu [  ], reg med floor [  x ], bed [ x ], chair at bedside [   ], a+o x3 [x], lethargic [  ],    nad [ x ]    pt with no further c/o diarrhea but still feels weak    Allergies    No Known Allergies        Vitals    T(F): 98.5 (04-21-20 @ 00:52), Max: 99.7 (04-20-20 @ 16:22)  HR: 70 (04-21-20 @ 00:52) (62 - 80)  BP: 133/73 (04-21-20 @ 00:52) (124/59 - 140/66)  RR: 18 (04-21-20 @ 00:52) (18 - 22)  SpO2: 96% (04-21-20 @ 00:52) (91% - 100%)  Wt(kg): --  CAPILLARY BLOOD GLUCOSE          Labs                          12.8   9.20  )-----------( 159      ( 20 Apr 2020 08:48 )             38.8       04-20    134<L>  |  98  |  11  ----------------------------<  207<H>  4.7   |  29  |  1.24    Ca    9.1      20 Apr 2020 16:09    TPro  7.8  /  Alb  2.7<L>  /  TBili  1.0  /  DBili  x   /  AST  63<H>  /  ALT  52  /  AlkPhos  207<H>  04-20            .Stool para tanner  04-19 @ 14:11   No Protozoa seen by trichrome stain  No Helminths or Protozoa seen in formalin concentrate  performed by iodine stain  (routine O+P not evaluated for Microsporidia,  Cryptosporidia, Cyclospora, or Isospora.)  One negative sample does not necessarily rule  out the presence of a parasitic infection.  --  --      .Stool rubi rabia  04-19 @ 14:10   GI PCR Results: NOT detected  *******Please Note:*******  GI panel PCR evaluates for:  Campylobacter, Plesiomonas shigelloides, Salmonella,  Vibrio, Yersinia enterocolitica, Enteroaggregative  Escherichia coli (EAEC), Enteropathogenic E.coli (EPEC),  Enterotoxigenic E. coli (ETEC) lt/st, Shiga-like  toxin-producing E. coli (STEC) stx1/stx2,  Shigella/ Enteroinvasive E. coli (EIEC), Cryptosporidium,  Cyclospora cayetanensis, Entamoeba histolytica,  Giardia lamblia, Adenovirus F 40/41, Astrovirus,  Norovirus GI/GII, Rotavirus A, Sapovirus  --  --          Radiology Results      Meds    MEDICATIONS  (STANDING):  enoxaparin Injectable 40 milliGRAM(s) SubCutaneous daily      MEDICATIONS  (PRN):  acetaminophen   Tablet .. 650 milliGRAM(s) Oral every 6 hours PRN Temp greater or equal to 38C (100.4F), Mild Pain (1 - 3)  ALBUTerol    90 MICROgram(s) HFA Inhaler 2 Puff(s) Inhalation every 4 hours PRN Shortness of Breath and/or Wheezing  guaifenesin/dextromethorphan  Syrup 10 milliLiter(s) Oral every 4 hours PRN Cough  ondansetron Injectable 4 milliGRAM(s) IV Push every 6 hours PRN Nausea and/or Vomiting      Physical Exam    Neuro :  no focal deficits  Respiratory: CTA B/L  CV: RRR, S1S2, no murmurs,   Abdominal: Soft, NT, ND +BS,  Extremities: No edema, + peripheral pulses    ASSESSMENT    pneumonia 2nd to covid- 19,   hyponatremia,   saida likely 2nd to diarrhea 2nd to covid-19    hypotension likely 2nd to dehydration      PLAN      cont albuterol inhaler  pulm f/u  afebrile  tmx 99.7   O2 sat on 1L n/c 96% ( range 84% - 100%)  cont O2 via n/c and taper as tolerated   cont incentive spirometer  covid test positive  completed plaquenil x 5 days  contact and airborne isolation  D-dimer, crp, ferritin noted   cont supportive care with tylenol prn, robitussin prn   serum creat wnl  stool pcr neg noted above  phys tx re-eval  cont current meds   d/c planning

## 2020-04-21 NOTE — PROGRESS NOTE ADULT - ASSESSMENT
1.  PNA  - Likely 2nd to Covid-19.   - Covid-19 PCR positive   - CXR noted; ? small effusion    Hold diuretics given CARITO.   - Completed Plaquenil   - Continue Vit C, Thiamine, Zinc   - Robitussin PRN for cough  - Tylenol PRN for temp  - Oxygen supplement   - Taper off O2 as tolerate per patient   - Monitor VS. temp and SpO2.   - DVT and GI PPX   - F/U CXR.   - Albuterol MDI - PRN  - Isolation Precautions  - PT eval for generalized weakness.     2. Dizziness  - S/P RRT with hypotension and bradycardia  - Monitor VS.     3. CARITO   - Avoid nephrotoxic drugs   - Monitor BMP

## 2020-04-21 NOTE — PROGRESS NOTE ADULT - SUBJECTIVE AND OBJECTIVE BOX
Pt is awake, alert, lying in bed in NAD. On RA. Still with c/o of generalized weakness. Saturating 96%.     INTERVAL HPI/OVERNIGHT EVENTS:      VITAL SIGNS:  T(F): 98.7 (04-21-20 @ 07:52)  HR: 71 (04-21-20 @ 07:52)  BP: 122/64 (04-21-20 @ 07:52)  RR: 18 (04-21-20 @ 07:52)  SpO2: 96% (04-21-20 @ 07:52)  Wt(kg): --  I&O's Detail          REVIEW OF SYSTEMS:    CONSTITUTIONAL:  No fevers, chills, sweats    HEENT:  Eyes:  No diplopia or blurred vision. ENT:  No earache, sore throat or runny nose.    CARDIOVASCULAR:  No pressure, squeezing, tightness, or heaviness about the chest; no palpitations.    RESPIRATORY:  Per HPI    GASTROINTESTINAL:  No abdominal pain, nausea, vomiting or diarrhea.    GENITOURINARY:  No dysuria, frequency or urgency.    NEUROLOGIC:  No paresthesias, fasciculations, seizures or weakness.    PSYCHIATRIC:  No disorder of thought or mood.      PHYSICAL EXAM:    Constitutional: Well developed and nourished  Eyes:Perrla  ENMT: normal  Neck:supple  Respiratory: good air entry  Cardiovascular: S1 S2 regular  Gastrointestinal: Soft, Non tender  Extremities: No edema  Vascular:normal  Neurological:Awake, alert,Ox3  Musculoskeletal: generalized weakness       MEDICATIONS  (STANDING):  enoxaparin Injectable 40 milliGRAM(s) SubCutaneous daily    MEDICATIONS  (PRN):  acetaminophen   Tablet .. 650 milliGRAM(s) Oral every 6 hours PRN Temp greater or equal to 38C (100.4F), Mild Pain (1 - 3)  ALBUTerol    90 MICROgram(s) HFA Inhaler 2 Puff(s) Inhalation every 4 hours PRN Shortness of Breath and/or Wheezing  guaifenesin/dextromethorphan  Syrup 10 milliLiter(s) Oral every 4 hours PRN Cough  ondansetron Injectable 4 milliGRAM(s) IV Push every 6 hours PRN Nausea and/or Vomiting      Allergies    No Known Allergies    Intolerances        LABS:                        12.6   7.55  )-----------( 168      ( 21 Apr 2020 07:28 )             38.4     04-21    134<L>  |  100  |  14  ----------------------------<  170<H>  4.3   |  28  |  1.15    Ca    9.1      21 Apr 2020 07:28  Phos  3.6     04-21  Mg     2.5     04-21    TPro  7.8  /  Alb  2.7<L>  /  TBili  1.0  /  DBili  x   /  AST  63<H>  /  ALT  52  /  AlkPhos  207<H>  04-20              CAPILLARY BLOOD GLUCOSE        pro-bnp -- 04-16 @ 14:32     d-dimer 228  04-16 @ 14:32  pro-bnp -- 04-16 @ 03:45     d-dimer 191  04-16 @ 03:45      RADIOLOGY & ADDITIONAL TESTS:    CXR:    Ct scan chest:    ekg;    echo:

## 2020-04-22 LAB
ALBUMIN SERPL ELPH-MCNC: 2.5 G/DL — LOW (ref 3.5–5)
ALP SERPL-CCNC: 359 U/L — HIGH (ref 40–120)
ALT FLD-CCNC: 122 U/L DA — HIGH (ref 10–60)
ANION GAP SERPL CALC-SCNC: 7 MMOL/L — SIGNIFICANT CHANGE UP (ref 5–17)
AST SERPL-CCNC: 134 U/L — HIGH (ref 10–40)
BILIRUB SERPL-MCNC: 1 MG/DL — SIGNIFICANT CHANGE UP (ref 0.2–1.2)
BUN SERPL-MCNC: 11 MG/DL — SIGNIFICANT CHANGE UP (ref 7–18)
CALCIUM SERPL-MCNC: 9.3 MG/DL — SIGNIFICANT CHANGE UP (ref 8.4–10.5)
CHLORIDE SERPL-SCNC: 97 MMOL/L — SIGNIFICANT CHANGE UP (ref 96–108)
CO2 SERPL-SCNC: 30 MMOL/L — SIGNIFICANT CHANGE UP (ref 22–31)
CREAT SERPL-MCNC: 1.13 MG/DL — SIGNIFICANT CHANGE UP (ref 0.5–1.3)
GLUCOSE BLDC GLUCOMTR-MCNC: 182 MG/DL — HIGH (ref 70–99)
GLUCOSE SERPL-MCNC: 169 MG/DL — HIGH (ref 70–99)
HCT VFR BLD CALC: 37.4 % — LOW (ref 39–50)
HGB BLD-MCNC: 12.3 G/DL — LOW (ref 13–17)
M PNEUMO IGM SER-ACNC: 79 UNITS/ML — SIGNIFICANT CHANGE UP
MCHC RBC-ENTMCNC: 28.7 PG — SIGNIFICANT CHANGE UP (ref 27–34)
MCHC RBC-ENTMCNC: 32.9 GM/DL — SIGNIFICANT CHANGE UP (ref 32–36)
MCV RBC AUTO: 87.2 FL — SIGNIFICANT CHANGE UP (ref 80–100)
MYCOPLASMA AG SPEC QL: NEGATIVE — SIGNIFICANT CHANGE UP
NRBC # BLD: 0 /100 WBCS — SIGNIFICANT CHANGE UP (ref 0–0)
PLATELET # BLD AUTO: 220 K/UL — SIGNIFICANT CHANGE UP (ref 150–400)
POTASSIUM SERPL-MCNC: 4.9 MMOL/L — SIGNIFICANT CHANGE UP (ref 3.5–5.3)
POTASSIUM SERPL-SCNC: 4.9 MMOL/L — SIGNIFICANT CHANGE UP (ref 3.5–5.3)
PROT SERPL-MCNC: 7.5 G/DL — SIGNIFICANT CHANGE UP (ref 6–8.3)
RBC # BLD: 4.29 M/UL — SIGNIFICANT CHANGE UP (ref 4.2–5.8)
RBC # FLD: 12.2 % — SIGNIFICANT CHANGE UP (ref 10.3–14.5)
SODIUM SERPL-SCNC: 134 MMOL/L — LOW (ref 135–145)
WBC # BLD: 8.51 K/UL — SIGNIFICANT CHANGE UP (ref 3.8–10.5)
WBC # FLD AUTO: 8.51 K/UL — SIGNIFICANT CHANGE UP (ref 3.8–10.5)

## 2020-04-22 RX ORDER — ACETAMINOPHEN 500 MG
650 TABLET ORAL EVERY 6 HOURS
Refills: 0 | Status: DISCONTINUED | OUTPATIENT
Start: 2020-04-22 | End: 2020-04-24

## 2020-04-22 RX ADMIN — ONDANSETRON 4 MILLIGRAM(S): 8 TABLET, FILM COATED ORAL at 13:10

## 2020-04-22 RX ADMIN — Medication 650 MILLIGRAM(S): at 10:45

## 2020-04-22 RX ADMIN — Medication 650 MILLIGRAM(S): at 20:36

## 2020-04-22 RX ADMIN — ENOXAPARIN SODIUM 40 MILLIGRAM(S): 100 INJECTION SUBCUTANEOUS at 11:50

## 2020-04-22 RX ADMIN — ONDANSETRON 4 MILLIGRAM(S): 8 TABLET, FILM COATED ORAL at 20:32

## 2020-04-22 NOTE — DIETITIAN INITIAL EVALUATION ADULT. - OTHER INFO
Pt w covid +. Pt on Airborne isolation. Pt seen for LOS. Pt  admitted W Diarrhea.  Pt on Regular diet PO tolerated LAbs Noted PO intake fair. D/W PCA.

## 2020-04-22 NOTE — PROGRESS NOTE ADULT - ASSESSMENT
1.  PNA  - Likely 2nd to Covid-19.   - Covid-19 PCR positive   - CXR noted; ? small effusion    Hold diuretics given CARITO.   - Completed Plaquenil   - Continue Vit C, Thiamine, Zinc   - Robitussin PRN for cough  - Tylenol PRN for temp  - Oxygen supplement   - Taper off O2 as tolerate per patient   - Monitor VS. temp and SpO2.   - DVT and GI PPX   - F/U CXR.   - Albuterol MDI - PRN  - Isolation Precautions  - PT eval for generalized weakness.   - D/C planning for ERIBERTO    2. Dizziness  - S/P RRT with hypotension and bradycardia  - Monitor VS.     3. CARITO   - Avoid nephrotoxic drugs   - Monitor BMP

## 2020-04-22 NOTE — PROGRESS NOTE ADULT - SUBJECTIVE AND OBJECTIVE BOX
Pt is awake, alert, lying in bed in NAD. C/O generalized weakness.     INTERVAL HPI/OVERNIGHT EVENTS:      VITAL SIGNS:  T(F): 98.4 (04-22-20 @ 07:52)  HR: 65 (04-22-20 @ 07:52)  BP: 114/57 (04-22-20 @ 07:52)  RR: 24 (04-22-20 @ 07:52)  SpO2: 92% (04-22-20 @ 12:20)  Wt(kg): --  I&O's Detail    21 Apr 2020 07:01  -  22 Apr 2020 07:00  --------------------------------------------------------  IN:    Oral Fluid: 120 mL  Total IN: 120 mL    OUT:  Total OUT: 0 mL    Total NET: 120 mL    REVIEW OF SYSTEMS:    CONSTITUTIONAL:  No fevers, chills, sweats    HEENT:  Eyes:  No diplopia or blurred vision. ENT:  No earache, sore throat or runny nose.    CARDIOVASCULAR:  No pressure, squeezing, tightness, or heaviness about the chest; no palpitations.    RESPIRATORY:  Per HPI    GASTROINTESTINAL:  No abdominal pain, nausea, vomiting or diarrhea.    GENITOURINARY:  No dysuria, frequency or urgency.    NEUROLOGIC:  No paresthesias, fasciculations, seizures or weakness.    PSYCHIATRIC:  No disorder of thought or mood.      PHYSICAL EXAM:    Constitutional: Well developed and nourished  Eyes: Perrla  ENMT: normal  Neck: supple  Respiratory: good air entry  Cardiovascular: S1 S2 regular  Gastrointestinal: Soft, Non tender  Extremities: No edema  Vascular: normal  Neurological: Awake, alert,Ox3  Musculoskeletal: Normal      MEDICATIONS  (STANDING):  enoxaparin Injectable 40 milliGRAM(s) SubCutaneous daily    MEDICATIONS  (PRN):  acetaminophen   Tablet .. 650 milliGRAM(s) Oral every 6 hours PRN Temp greater or equal to 38C (100.4F), Mild Pain (1 - 3)  acetaminophen   Tablet .. 650 milliGRAM(s) Oral every 6 hours PRN Moderate Pain (4 - 6)  ALBUTerol    90 MICROgram(s) HFA Inhaler 2 Puff(s) Inhalation every 4 hours PRN Shortness of Breath and/or Wheezing  guaifenesin/dextromethorphan  Syrup 10 milliLiter(s) Oral every 4 hours PRN Cough  ondansetron Injectable 4 milliGRAM(s) IV Push every 6 hours PRN Nausea and/or Vomiting      Allergies    No Known Allergies    Intolerances        LABS:                        12.3   8.51  )-----------( 220      ( 22 Apr 2020 06:53 )             37.4     04-22    134<L>  |  97  |  11  ----------------------------<  169<H>  4.9   |  30  |  1.13    Ca    9.3      22 Apr 2020 06:53  Phos  3.6     04-21  Mg     2.5     04-21    TPro  7.5  /  Alb  2.5<L>  /  TBili  1.0  /  DBili  x   /  AST  134<H>  /  ALT  122<H>  /  AlkPhos  359<H>  04-22    CAPILLARY BLOOD GLUCOSE    pro-bnp -- 04-16 @ 14:32     d-dimer 228  04-16 @ 14:32  pro-bnp -- 04-16 @ 03:45     d-dimer 191  04-16 @ 03:45      RADIOLOGY & ADDITIONAL TESTS:    CXR:    Ct scan chest:    ekg;    echo: Pt is awake, alert, lying in bed in NAD. C/O generalized weakness. No cough or sob at rest     INTERVAL HPI/OVERNIGHT EVENTS:      VITAL SIGNS:  T(F): 98.4 (04-22-20 @ 07:52)  HR: 65 (04-22-20 @ 07:52)  BP: 114/57 (04-22-20 @ 07:52)  RR: 24 (04-22-20 @ 07:52)  SpO2: 92% (04-22-20 @ 12:20)  Wt(kg): --  I&O's Detail    21 Apr 2020 07:01  -  22 Apr 2020 07:00  --------------------------------------------------------  IN:    Oral Fluid: 120 mL  Total IN: 120 mL    OUT:  Total OUT: 0 mL    Total NET: 120 mL    REVIEW OF SYSTEMS:    CONSTITUTIONAL:  No fevers, chills, sweats    HEENT:  Eyes:  No diplopia or blurred vision. ENT:  No earache, sore throat or runny nose.    CARDIOVASCULAR:  No pressure, squeezing, tightness, or heaviness about the chest; no palpitations.    RESPIRATORY:  Per HPI    GASTROINTESTINAL:  No abdominal pain, nausea, vomiting or diarrhea.    GENITOURINARY:  No dysuria, frequency or urgency.    NEUROLOGIC:  No paresthesias, fasciculations, seizures or weakness.    PSYCHIATRIC:  No disorder of thought or mood.      PHYSICAL EXAM:    Constitutional: Well developed and nourished  Eyes: Perrla  ENMT: normal  Neck: supple  Respiratory: good air entry  Cardiovascular: S1 S2 regular  Gastrointestinal: Soft, Non tender  Extremities: No edema  Vascular: normal  Neurological: Awake, alert,Ox3  Musculoskeletal: Normal      MEDICATIONS  (STANDING):  enoxaparin Injectable 40 milliGRAM(s) SubCutaneous daily    MEDICATIONS  (PRN):  acetaminophen   Tablet .. 650 milliGRAM(s) Oral every 6 hours PRN Temp greater or equal to 38C (100.4F), Mild Pain (1 - 3)  acetaminophen   Tablet .. 650 milliGRAM(s) Oral every 6 hours PRN Moderate Pain (4 - 6)  ALBUTerol    90 MICROgram(s) HFA Inhaler 2 Puff(s) Inhalation every 4 hours PRN Shortness of Breath and/or Wheezing  guaifenesin/dextromethorphan  Syrup 10 milliLiter(s) Oral every 4 hours PRN Cough  ondansetron Injectable 4 milliGRAM(s) IV Push every 6 hours PRN Nausea and/or Vomiting      Allergies    No Known Allergies    Intolerances        LABS:                        12.3   8.51  )-----------( 220      ( 22 Apr 2020 06:53 )             37.4     04-22    134<L>  |  97  |  11  ----------------------------<  169<H>  4.9   |  30  |  1.13    Ca    9.3      22 Apr 2020 06:53  Phos  3.6     04-21  Mg     2.5     04-21    TPro  7.5  /  Alb  2.5<L>  /  TBili  1.0  /  DBili  x   /  AST  134<H>  /  ALT  122<H>  /  AlkPhos  359<H>  04-22    CAPILLARY BLOOD GLUCOSE    pro-bnp -- 04-16 @ 14:32     d-dimer 228  04-16 @ 14:32  pro-bnp -- 04-16 @ 03:45     d-dimer 191  04-16 @ 03:45      RADIOLOGY & ADDITIONAL TESTS:    CXR:  < from: Xray Chest 1 View AP/PA (04.16.20 @ 03:57) >  IMPRESSION:    Bibasilar airspace opacities which may represent atelectasis or infiltrate. Blunting of the right lateral costophrenic angle which may represent a small effusion.    < end of copied text >    Ct scan chest:    ekg;    echo:

## 2020-04-22 NOTE — PROGRESS NOTE ADULT - SUBJECTIVE AND OBJECTIVE BOX
Patient is a 74y old  Male who presents with a chief complaint of Abdominal pain (21 Apr 2020 12:24)    pt seen in icu [  ], reg med floor [  x ], bed [ x ], chair at bedside [   ], a+o x3 [x], lethargic [  ],    nad [ x ]    pt with no further c/o diarrhea but still feels weak      Allergies    No Known Allergies        Vitals    T(F): 98.6 (04-21-20 @ 23:20), Max: 100.8 (04-21-20 @ 20:36)  HR: 63 (04-21-20 @ 23:20) (63 - 78)  BP: 137/72 (04-21-20 @ 23:20) (120/68 - 137/72)  RR: 20 (04-21-20 @ 23:20) (18 - 22)  SpO2: 97% (04-21-20 @ 23:20) (95% - 98%)  Wt(kg): --  CAPILLARY BLOOD GLUCOSE          Labs                          12.6   7.55  )-----------( 168      ( 21 Apr 2020 07:28 )             38.4       04-21    134<L>  |  100  |  14  ----------------------------<  170<H>  4.3   |  28  |  1.15    Ca    9.1      21 Apr 2020 07:28  Phos  3.6     04-21  Mg     2.5     04-21    TPro  7.8  /  Alb  2.7<L>  /  TBili  1.0  /  DBili  x   /  AST  63<H>  /  ALT  52  /  AlkPhos  207<H>  04-20            .Stool para tanner  04-19 @ 14:11   No Protozoa seen by trichrome stain  No Helminths or Protozoa seen in formalin concentrate  performed by iodine stain  (routine O+P not evaluated for Microsporidia,  Cryptosporidia, Cyclospora, or Isospora.)  One negative sample does not necessarily rule  out the presence of a parasitic infection.  --  --      .Stool rubi rabia  04-19 @ 14:10   GI PCR Results: NOT detected  *******Please Note:*******  GI panel PCR evaluates for:  Campylobacter, Plesiomonas shigelloides, Salmonella,  Vibrio, Yersinia enterocolitica, Enteroaggregative  Escherichia coli (EAEC), Enteropathogenic E.coli (EPEC),  Enterotoxigenic E. coli (ETEC) lt/st, Shiga-like  toxin-producing E. coli (STEC) stx1/stx2,  Shigella/ Enteroinvasive E. coli (EIEC), Cryptosporidium,  Cyclospora cayetanensis, Entamoeba histolytica,  Giardia lamblia, Adenovirus F 40/41, Astrovirus,  Norovirus GI/GII, Rotavirus A, Sapovirus  --  --          Radiology Results      Meds    MEDICATIONS  (STANDING):  enoxaparin Injectable 40 milliGRAM(s) SubCutaneous daily      MEDICATIONS  (PRN):  acetaminophen   Tablet .. 650 milliGRAM(s) Oral every 6 hours PRN Temp greater or equal to 38C (100.4F), Mild Pain (1 - 3)  ALBUTerol    90 MICROgram(s) HFA Inhaler 2 Puff(s) Inhalation every 4 hours PRN Shortness of Breath and/or Wheezing  guaifenesin/dextromethorphan  Syrup 10 milliLiter(s) Oral every 4 hours PRN Cough  ondansetron Injectable 4 milliGRAM(s) IV Push every 6 hours PRN Nausea and/or Vomiting      Physical Exam    Neuro :  no focal deficits  Respiratory: CTA B/L  CV: RRR, S1S2, no murmurs,   Abdominal: Soft, NT, ND +BS,  Extremities: No edema, + peripheral pulses    ASSESSMENT    pneumonia 2nd to covid- 19,   hyponatremia,   saida likely 2nd to diarrhea 2nd to covid-19    hypotension likely 2nd to dehydration      PLAN      cont albuterol inhaler  pulm f/u  afebrile  tmx 99.7   O2 sat on 1L n/c 96% ( range 84% - 100%)  cont O2 via n/c and taper as tolerated   cont incentive spirometer  covid test positive  completed plaquenil x 5 days  contact and airborne isolation  D-dimer, crp, ferritin noted   cont supportive care with tylenol prn, robitussin prn   serum creat wnl  stool pcr neg noted above  phys tx re-eval  cont current meds   d/c planning Patient is a 74y old  Male who presents with a chief complaint of Abdominal pain (21 Apr 2020 12:24)    pt seen in icu [  ], reg med floor [  x ], bed [ x ], chair at bedside [   ], a+o x3 [x], lethargic [  ],    nad [ x ]    pt with no further c/o diarrhea but still feels weak      Allergies    No Known Allergies        Vitals    T(F): 98.6 (04-21-20 @ 23:20), Max: 100.8 (04-21-20 @ 20:36)  HR: 63 (04-21-20 @ 23:20) (63 - 78)  BP: 137/72 (04-21-20 @ 23:20) (120/68 - 137/72)  RR: 20 (04-21-20 @ 23:20) (18 - 22)  SpO2: 97% (04-21-20 @ 23:20) (95% - 98%)  Wt(kg): --  CAPILLARY BLOOD GLUCOSE          Labs                          12.6   7.55  )-----------( 168      ( 21 Apr 2020 07:28 )             38.4       04-21    134<L>  |  100  |  14  ----------------------------<  170<H>  4.3   |  28  |  1.15    Ca    9.1      21 Apr 2020 07:28  Phos  3.6     04-21  Mg     2.5     04-21    TPro  7.8  /  Alb  2.7<L>  /  TBili  1.0  /  DBili  x   /  AST  63<H>  /  ALT  52  /  AlkPhos  207<H>  04-20            .Stool para tanner  04-19 @ 14:11   No Protozoa seen by trichrome stain  No Helminths or Protozoa seen in formalin concentrate  performed by iodine stain  (routine O+P not evaluated for Microsporidia,  Cryptosporidia, Cyclospora, or Isospora.)  One negative sample does not necessarily rule  out the presence of a parasitic infection.  --  --      .Stool rubi rabia  04-19 @ 14:10   GI PCR Results: NOT detected  *******Please Note:*******  GI panel PCR evaluates for:  Campylobacter, Plesiomonas shigelloides, Salmonella,  Vibrio, Yersinia enterocolitica, Enteroaggregative  Escherichia coli (EAEC), Enteropathogenic E.coli (EPEC),  Enterotoxigenic E. coli (ETEC) lt/st, Shiga-like  toxin-producing E. coli (STEC) stx1/stx2,  Shigella/ Enteroinvasive E. coli (EIEC), Cryptosporidium,  Cyclospora cayetanensis, Entamoeba histolytica,  Giardia lamblia, Adenovirus F 40/41, Astrovirus,  Norovirus GI/GII, Rotavirus A, Sapovirus  --  --          Radiology Results      Meds    MEDICATIONS  (STANDING):  enoxaparin Injectable 40 milliGRAM(s) SubCutaneous daily      MEDICATIONS  (PRN):  acetaminophen   Tablet .. 650 milliGRAM(s) Oral every 6 hours PRN Temp greater or equal to 38C (100.4F), Mild Pain (1 - 3)  ALBUTerol    90 MICROgram(s) HFA Inhaler 2 Puff(s) Inhalation every 4 hours PRN Shortness of Breath and/or Wheezing  guaifenesin/dextromethorphan  Syrup 10 milliLiter(s) Oral every 4 hours PRN Cough  ondansetron Injectable 4 milliGRAM(s) IV Push every 6 hours PRN Nausea and/or Vomiting      Physical Exam    Neuro :  no focal deficits  Respiratory: CTA B/L  CV: RRR, S1S2, no murmurs,   Abdominal: Soft, NT, ND +BS,  Extremities: No edema, + peripheral pulses    ASSESSMENT    pneumonia 2nd to covid- 19,   hyponatremia,   saida likely 2nd to diarrhea 2nd to covid-19    hypotension likely 2nd to dehydration      PLAN      cont albuterol inhaler  pulm f/u  afebrile  tmx 100.8   O2 sat on 4L n/c 97% ( range 95% - 98%)  cont O2 via n/c and taper as tolerated   cont incentive spirometer  covid test positive  completed plaquenil x 5 days  contact and airborne isolation  D-dimer, crp, ferritin noted   cont supportive care with tylenol prn, robitussin prn   serum creat wnl  stool pcr neg noted above  phys tx re-eval  cont current meds   d/c planning for possible lobito

## 2020-04-23 LAB
ALBUMIN SERPL ELPH-MCNC: 2.6 G/DL — LOW (ref 3.5–5)
ALP SERPL-CCNC: 406 U/L — HIGH (ref 40–120)
ALT FLD-CCNC: 158 U/L DA — HIGH (ref 10–60)
ANION GAP SERPL CALC-SCNC: 6 MMOL/L — SIGNIFICANT CHANGE UP (ref 5–17)
AST SERPL-CCNC: 125 U/L — HIGH (ref 10–40)
BILIRUB SERPL-MCNC: 0.9 MG/DL — SIGNIFICANT CHANGE UP (ref 0.2–1.2)
BUN SERPL-MCNC: 11 MG/DL — SIGNIFICANT CHANGE UP (ref 7–18)
CALCIUM SERPL-MCNC: 9.5 MG/DL — SIGNIFICANT CHANGE UP (ref 8.4–10.5)
CHLORIDE SERPL-SCNC: 98 MMOL/L — SIGNIFICANT CHANGE UP (ref 96–108)
CO2 SERPL-SCNC: 31 MMOL/L — SIGNIFICANT CHANGE UP (ref 22–31)
CREAT SERPL-MCNC: 1.14 MG/DL — SIGNIFICANT CHANGE UP (ref 0.5–1.3)
CRP SERPL-MCNC: 15.02 MG/DL — HIGH (ref 0–0.4)
ERYTHROCYTE [SEDIMENTATION RATE] IN BLOOD: 87 MM/HR — HIGH (ref 0–20)
FERRITIN SERPL-MCNC: 997 NG/ML — HIGH (ref 30–400)
GLUCOSE SERPL-MCNC: 190 MG/DL — HIGH (ref 70–99)
HCT VFR BLD CALC: 37.8 % — LOW (ref 39–50)
HGB BLD-MCNC: 12.2 G/DL — LOW (ref 13–17)
LDH SERPL L TO P-CCNC: 362 U/L — HIGH (ref 120–225)
MAGNESIUM SERPL-MCNC: 2.5 MG/DL — SIGNIFICANT CHANGE UP (ref 1.6–2.6)
MCHC RBC-ENTMCNC: 28 PG — SIGNIFICANT CHANGE UP (ref 27–34)
MCHC RBC-ENTMCNC: 32.3 GM/DL — SIGNIFICANT CHANGE UP (ref 32–36)
MCV RBC AUTO: 86.9 FL — SIGNIFICANT CHANGE UP (ref 80–100)
NRBC # BLD: 0 /100 WBCS — SIGNIFICANT CHANGE UP (ref 0–0)
PHOSPHATE SERPL-MCNC: 3.6 MG/DL — SIGNIFICANT CHANGE UP (ref 2.5–4.5)
PLATELET # BLD AUTO: 255 K/UL — SIGNIFICANT CHANGE UP (ref 150–400)
POTASSIUM SERPL-MCNC: 5.1 MMOL/L — SIGNIFICANT CHANGE UP (ref 3.5–5.3)
POTASSIUM SERPL-SCNC: 5.1 MMOL/L — SIGNIFICANT CHANGE UP (ref 3.5–5.3)
PROT SERPL-MCNC: 7.9 G/DL — SIGNIFICANT CHANGE UP (ref 6–8.3)
RBC # BLD: 4.35 M/UL — SIGNIFICANT CHANGE UP (ref 4.2–5.8)
RBC # FLD: 12.2 % — SIGNIFICANT CHANGE UP (ref 10.3–14.5)
SODIUM SERPL-SCNC: 135 MMOL/L — SIGNIFICANT CHANGE UP (ref 135–145)
WBC # BLD: 8.79 K/UL — SIGNIFICANT CHANGE UP (ref 3.8–10.5)
WBC # FLD AUTO: 8.79 K/UL — SIGNIFICANT CHANGE UP (ref 3.8–10.5)

## 2020-04-23 RX ORDER — ERGOCALCIFEROL 1.25 MG/1
50000 CAPSULE ORAL DAILY
Refills: 0 | Status: DISCONTINUED | OUTPATIENT
Start: 2020-04-23 | End: 2020-04-24

## 2020-04-23 RX ADMIN — ONDANSETRON 4 MILLIGRAM(S): 8 TABLET, FILM COATED ORAL at 10:51

## 2020-04-23 RX ADMIN — ERGOCALCIFEROL 50000 UNIT(S): 1.25 CAPSULE ORAL at 10:20

## 2020-04-23 RX ADMIN — ENOXAPARIN SODIUM 40 MILLIGRAM(S): 100 INJECTION SUBCUTANEOUS at 10:20

## 2020-04-23 RX ADMIN — Medication 30 MILLILITER(S): at 21:24

## 2020-04-23 RX ADMIN — Medication 650 MILLIGRAM(S): at 22:40

## 2020-04-23 NOTE — PROGRESS NOTE ADULT - SUBJECTIVE AND OBJECTIVE BOX
Patient is a 74y old  Male who presents with a chief complaint of Abdominal pain (22 Apr 2020 13:19)    pt seen in icu [  ], reg med floor [  x ], bed [ x ], chair at bedside [   ], a+o x3 [x], lethargic [  ],    nad [ x ]    pt with no further c/o diarrhea but still feels weak      Allergies    No Known Allergies        Vitals    T(F): 99.5 (04-23-20 @ 00:36), Max: 99.5 (04-23-20 @ 00:36)  HR: 72 (04-23-20 @ 00:36) (65 - 78)  BP: 119/73 (04-23-20 @ 00:36) (114/57 - 130/62)  RR: 20 (04-23-20 @ 00:36) (20 - 24)  SpO2: 97% (04-23-20 @ 00:36) (92% - 100%)  Wt(kg): --  CAPILLARY BLOOD GLUCOSE          Labs                          12.3   8.51  )-----------( 220      ( 22 Apr 2020 06:53 )             37.4       04-22    134<L>  |  97  |  11  ----------------------------<  169<H>  4.9   |  30  |  1.13    Ca    9.3      22 Apr 2020 06:53  Phos  3.6     04-21  Mg     2.5     04-21    TPro  7.5  /  Alb  2.5<L>  /  TBili  1.0  /  DBili  x   /  AST  134<H>  /  ALT  122<H>  /  AlkPhos  359<H>  04-22            .Stool para tanner  04-19 @ 14:11   No Protozoa seen by trichrome stain  No Helminths or Protozoa seen in formalin concentrate  performed by iodine stain  (routine O+P not evaluated for Microsporidia,  Cryptosporidia, Cyclospora, or Isospora.)  One negative sample does not necessarily rule  out the presence of a parasitic infection.  --  --      .Stool rubi rabia  04-19 @ 14:10   GI PCR Results: NOT detected  *******Please Note:*******  GI panel PCR evaluates for:  Campylobacter, Plesiomonas shigelloides, Salmonella,  Vibrio, Yersinia enterocolitica, Enteroaggregative  Escherichia coli (EAEC), Enteropathogenic E.coli (EPEC),  Enterotoxigenic E. coli (ETEC) lt/st, Shiga-like  toxin-producing E. coli (STEC) stx1/stx2,  Shigella/ Enteroinvasive E. coli (EIEC), Cryptosporidium,  Cyclospora cayetanensis, Entamoeba histolytica,  Giardia lamblia, Adenovirus F 40/41, Astrovirus,  Norovirus GI/GII, Rotavirus A, Sapovirus  --  --          Radiology Results      Meds    MEDICATIONS  (STANDING):  enoxaparin Injectable 40 milliGRAM(s) SubCutaneous daily      MEDICATIONS  (PRN):  acetaminophen   Tablet .. 650 milliGRAM(s) Oral every 6 hours PRN Temp greater or equal to 38C (100.4F), Mild Pain (1 - 3)  acetaminophen   Tablet .. 650 milliGRAM(s) Oral every 6 hours PRN Moderate Pain (4 - 6)  ALBUTerol    90 MICROgram(s) HFA Inhaler 2 Puff(s) Inhalation every 4 hours PRN Shortness of Breath and/or Wheezing  guaifenesin/dextromethorphan  Syrup 10 milliLiter(s) Oral every 4 hours PRN Cough  ondansetron Injectable 4 milliGRAM(s) IV Push every 6 hours PRN Nausea and/or Vomiting      Physical Exam    Neuro :  no focal deficits  Respiratory: CTA B/L  CV: RRR, S1S2, no murmurs,   Abdominal: Soft, NT, ND +BS,  Extremities: No edema, + peripheral pulses    ASSESSMENT    pneumonia 2nd to covid- 19,   hyponatremia,   saida likely 2nd to diarrhea 2nd to covid-19    hypotension likely 2nd to dehydration      PLAN      cont albuterol inhaler  pulm f/u  afebrile  tmx 100.8   O2 sat on 4L n/c 97% ( range 95% - 98%)  cont O2 via n/c and taper as tolerated   cont incentive spirometer  covid test positive  completed plaquenil x 5 days  contact and airborne isolation  D-dimer, crp, ferritin noted   cont supportive care with tylenol prn, robitussin prn   serum creat wnl  stool pcr neg noted above  phys tx re-eval  cont current meds   d/c planning for possible lobito Patient is a 74y old  Male who presents with a chief complaint of Abdominal pain (22 Apr 2020 13:19)    pt seen in icu [  ], reg med floor [  x ], bed [ x ], chair at bedside [   ], a+o x3 [x], lethargic [  ],    nad [ x ]    pt with no further c/o diarrhea but still feels weak      Allergies    No Known Allergies        Vitals    T(F): 99.5 (04-23-20 @ 00:36), Max: 99.5 (04-23-20 @ 00:36)  HR: 72 (04-23-20 @ 00:36) (65 - 78)  BP: 119/73 (04-23-20 @ 00:36) (114/57 - 130/62)  RR: 20 (04-23-20 @ 00:36) (20 - 24)  SpO2: 97% (04-23-20 @ 00:36) (92% - 100%)  Wt(kg): --  CAPILLARY BLOOD GLUCOSE          Labs                          12.3   8.51  )-----------( 220      ( 22 Apr 2020 06:53 )             37.4       04-22    134<L>  |  97  |  11  ----------------------------<  169<H>  4.9   |  30  |  1.13    Ca    9.3      22 Apr 2020 06:53  Phos  3.6     04-21  Mg     2.5     04-21    TPro  7.5  /  Alb  2.5<L>  /  TBili  1.0  /  DBili  x   /  AST  134<H>  /  ALT  122<H>  /  AlkPhos  359<H>  04-22            .Stool para tanner  04-19 @ 14:11   No Protozoa seen by trichrome stain  No Helminths or Protozoa seen in formalin concentrate  performed by iodine stain  (routine O+P not evaluated for Microsporidia,  Cryptosporidia, Cyclospora, or Isospora.)  One negative sample does not necessarily rule  out the presence of a parasitic infection.  --  --      .Stool rubi rabia  04-19 @ 14:10   GI PCR Results: NOT detected  *******Please Note:*******  GI panel PCR evaluates for:  Campylobacter, Plesiomonas shigelloides, Salmonella,  Vibrio, Yersinia enterocolitica, Enteroaggregative  Escherichia coli (EAEC), Enteropathogenic E.coli (EPEC),  Enterotoxigenic E. coli (ETEC) lt/st, Shiga-like  toxin-producing E. coli (STEC) stx1/stx2,  Shigella/ Enteroinvasive E. coli (EIEC), Cryptosporidium,  Cyclospora cayetanensis, Entamoeba histolytica,  Giardia lamblia, Adenovirus F 40/41, Astrovirus,  Norovirus GI/GII, Rotavirus A, Sapovirus  --  --          Radiology Results      Meds    MEDICATIONS  (STANDING):  enoxaparin Injectable 40 milliGRAM(s) SubCutaneous daily      MEDICATIONS  (PRN):  acetaminophen   Tablet .. 650 milliGRAM(s) Oral every 6 hours PRN Temp greater or equal to 38C (100.4F), Mild Pain (1 - 3)  acetaminophen   Tablet .. 650 milliGRAM(s) Oral every 6 hours PRN Moderate Pain (4 - 6)  ALBUTerol    90 MICROgram(s) HFA Inhaler 2 Puff(s) Inhalation every 4 hours PRN Shortness of Breath and/or Wheezing  guaifenesin/dextromethorphan  Syrup 10 milliLiter(s) Oral every 4 hours PRN Cough  ondansetron Injectable 4 milliGRAM(s) IV Push every 6 hours PRN Nausea and/or Vomiting      Physical Exam    Neuro :  no focal deficits  Respiratory: CTA B/L  CV: RRR, S1S2, no murmurs,   Abdominal: Soft, NT, ND +BS,  Extremities: No edema, + peripheral pulses    ASSESSMENT    pneumonia 2nd to covid- 19,   hyponatremia,   saida likely 2nd to diarrhea 2nd to covid-19    hypotension likely 2nd to dehydration      PLAN      cont albuterol inhaler  pulm f/u  afebrile  tmx 99.5   O2 sat on ra 97% ( range 92% - 100%)  cont O2 via n/c if needed   cont incentive spirometer  covid test positive  completed plaquenil x 5 days  contact and airborne isolation  D-dimer, crp, ferritin noted   cont supportive care with tylenol prn, robitussin prn   serum creat wnl  stool pcr neg noted above  phys tx re-eval  cont current meds   d/c planning for possible lobito

## 2020-04-23 NOTE — PROGRESS NOTE ADULT - ASSESSMENT
1.  PNA  - Likely 2nd to Covid-19.   - Covid-19 PCR positive   - CXR noted  - Completed Plaquenil   - Continue Vit C, Thiamine, Zinc   - Robitussin PRN for cough  - Tylenol PRN for temp  - Oxygen supplement PRN   - Monitor VS. temp and SpO2.   - DVT and GI PPX   - F/U CXR.   - Albuterol MDI - PRN  - Isolation Precautions  - D/C planning for ERIBERTO    2. Dizziness  - S/P RRT with hypotension and bradycardia  - Monitor VS.     3. CARITO   - Avoid nephrotoxic drugs   - Monitor BMP

## 2020-04-23 NOTE — PROGRESS NOTE ADULT - SUBJECTIVE AND OBJECTIVE BOX
Pt is awake, alert, lying in bed in NAD. D/C planning for ERIBERTO.     INTERVAL HPI/OVERNIGHT EVENTS:      VITAL SIGNS:  T(F): 98 (04-23-20 @ 08:35)  HR: 67 (04-23-20 @ 08:35)  BP: 120/62 (04-23-20 @ 08:35)  RR: 24 (04-23-20 @ 08:35)  SpO2: 97% (04-23-20 @ 08:35)  Wt(kg): --  I&O's Detail          REVIEW OF SYSTEMS:    CONSTITUTIONAL:  No fevers, chills, sweats    HEENT:  Eyes:  No diplopia or blurred vision. ENT:  No earache, sore throat or runny nose.    CARDIOVASCULAR:  No pressure, squeezing, tightness, or heaviness about the chest; no palpitations.    RESPIRATORY:  Per HPI    GASTROINTESTINAL:  No abdominal pain, nausea, vomiting or diarrhea.    GENITOURINARY:  No dysuria, frequency or urgency.    NEUROLOGIC:  No paresthesias, fasciculations, seizures or weakness.    PSYCHIATRIC:  No disorder of thought or mood.      PHYSICAL EXAM:    Constitutional: Well developed and nourished  Eyes:Perrla  ENMT: normal  Neck:supple  Respiratory: good air entry  Cardiovascular: S1 S2 regular  Gastrointestinal: Soft, Non tender  Extremities: No edema  Vascular:normal  Neurological:Awake, alert,Ox3  Musculoskeletal:Normal      MEDICATIONS  (STANDING):  enoxaparin Injectable 40 milliGRAM(s) SubCutaneous daily  ergocalciferol 42200 Unit(s) Oral daily    MEDICATIONS  (PRN):  acetaminophen   Tablet .. 650 milliGRAM(s) Oral every 6 hours PRN Temp greater or equal to 38C (100.4F), Mild Pain (1 - 3)  acetaminophen   Tablet .. 650 milliGRAM(s) Oral every 6 hours PRN Moderate Pain (4 - 6)  ALBUTerol    90 MICROgram(s) HFA Inhaler 2 Puff(s) Inhalation every 4 hours PRN Shortness of Breath and/or Wheezing  guaifenesin/dextromethorphan  Syrup 10 milliLiter(s) Oral every 4 hours PRN Cough  ondansetron Injectable 4 milliGRAM(s) IV Push every 6 hours PRN Nausea and/or Vomiting      Allergies    No Known Allergies    Intolerances        LABS:                        12.2   8.79  )-----------( 255      ( 23 Apr 2020 06:13 )             37.8     04-23    135  |  98  |  11  ----------------------------<  190<H>  5.1   |  31  |  1.14    Ca    9.5      23 Apr 2020 06:13  Phos  3.6     04-23  Mg     2.5     04-23    TPro  7.9  /  Alb  2.6<L>  /  TBili  0.9  /  DBili  x   /  AST  125<H>  /  ALT  158<H>  /  AlkPhos  406<H>  04-23              CAPILLARY BLOOD GLUCOSE        pro-bnp -- 04-16 @ 14:32     d-dimer 228  04-16 @ 14:32      RADIOLOGY & ADDITIONAL TESTS:    CXR:    Ct scan chest:    ekg;    echo:

## 2020-04-24 ENCOUNTER — TRANSCRIPTION ENCOUNTER (OUTPATIENT)
Age: 75
End: 2020-04-24

## 2020-04-24 VITALS — OXYGEN SATURATION: 92 %

## 2020-04-24 PROCEDURE — 99285 EMERGENCY DEPT VISIT HI MDM: CPT

## 2020-04-24 PROCEDURE — 87507 IADNA-DNA/RNA PROBE TQ 12-25: CPT

## 2020-04-24 PROCEDURE — 83605 ASSAY OF LACTIC ACID: CPT

## 2020-04-24 PROCEDURE — 84100 ASSAY OF PHOSPHORUS: CPT

## 2020-04-24 PROCEDURE — 87651 STREP A DNA AMP PROBE: CPT

## 2020-04-24 PROCEDURE — 87581 M.PNEUMON DNA AMP PROBE: CPT

## 2020-04-24 PROCEDURE — 83735 ASSAY OF MAGNESIUM: CPT

## 2020-04-24 PROCEDURE — 82550 ASSAY OF CK (CPK): CPT

## 2020-04-24 PROCEDURE — 80053 COMPREHEN METABOLIC PANEL: CPT

## 2020-04-24 PROCEDURE — 96374 THER/PROPH/DIAG INJ IV PUSH: CPT

## 2020-04-24 PROCEDURE — 99053 MED SERV 10PM-8AM 24 HR FAC: CPT

## 2020-04-24 PROCEDURE — 87486 CHLMYD PNEUM DNA AMP PROBE: CPT

## 2020-04-24 PROCEDURE — 87389 HIV-1 AG W/HIV-1&-2 AB AG IA: CPT

## 2020-04-24 PROCEDURE — 93005 ELECTROCARDIOGRAM TRACING: CPT

## 2020-04-24 PROCEDURE — 83615 LACTATE (LD) (LDH) ENZYME: CPT

## 2020-04-24 PROCEDURE — 85730 THROMBOPLASTIN TIME PARTIAL: CPT

## 2020-04-24 PROCEDURE — 82962 GLUCOSE BLOOD TEST: CPT

## 2020-04-24 PROCEDURE — 87798 DETECT AGENT NOS DNA AMP: CPT

## 2020-04-24 PROCEDURE — 82306 VITAMIN D 25 HYDROXY: CPT

## 2020-04-24 PROCEDURE — 96375 TX/PRO/DX INJ NEW DRUG ADDON: CPT

## 2020-04-24 PROCEDURE — 85027 COMPLETE CBC AUTOMATED: CPT

## 2020-04-24 PROCEDURE — 87633 RESP VIRUS 12-25 TARGETS: CPT

## 2020-04-24 PROCEDURE — 83540 ASSAY OF IRON: CPT

## 2020-04-24 PROCEDURE — 87177 OVA AND PARASITES SMEARS: CPT

## 2020-04-24 PROCEDURE — 82607 VITAMIN B-12: CPT

## 2020-04-24 PROCEDURE — 85652 RBC SED RATE AUTOMATED: CPT

## 2020-04-24 PROCEDURE — 83036 HEMOGLOBIN GLYCOSYLATED A1C: CPT

## 2020-04-24 PROCEDURE — 74177 CT ABD & PELVIS W/CONTRAST: CPT

## 2020-04-24 PROCEDURE — 80061 LIPID PANEL: CPT

## 2020-04-24 PROCEDURE — 85379 FIBRIN DEGRADATION QUANT: CPT

## 2020-04-24 PROCEDURE — 82746 ASSAY OF FOLIC ACID SERUM: CPT

## 2020-04-24 PROCEDURE — 71045 X-RAY EXAM CHEST 1 VIEW: CPT

## 2020-04-24 PROCEDURE — 36415 COLL VENOUS BLD VENIPUNCTURE: CPT

## 2020-04-24 PROCEDURE — 80048 BASIC METABOLIC PNL TOTAL CA: CPT

## 2020-04-24 PROCEDURE — 84484 ASSAY OF TROPONIN QUANT: CPT

## 2020-04-24 PROCEDURE — 86738 MYCOPLASMA ANTIBODY: CPT

## 2020-04-24 PROCEDURE — 86140 C-REACTIVE PROTEIN: CPT

## 2020-04-24 PROCEDURE — 82728 ASSAY OF FERRITIN: CPT

## 2020-04-24 PROCEDURE — 87635 SARS-COV-2 COVID-19 AMP PRB: CPT

## 2020-04-24 PROCEDURE — 85384 FIBRINOGEN ACTIVITY: CPT

## 2020-04-24 PROCEDURE — 84145 PROCALCITONIN (PCT): CPT

## 2020-04-24 PROCEDURE — 80074 ACUTE HEPATITIS PANEL: CPT

## 2020-04-24 PROCEDURE — 85610 PROTHROMBIN TIME: CPT

## 2020-04-24 RX ADMIN — ENOXAPARIN SODIUM 40 MILLIGRAM(S): 100 INJECTION SUBCUTANEOUS at 10:23

## 2020-04-24 RX ADMIN — ERGOCALCIFEROL 50000 UNIT(S): 1.25 CAPSULE ORAL at 14:09

## 2020-04-24 NOTE — PROGRESS NOTE ADULT - SUBJECTIVE AND OBJECTIVE BOX
Patient is a 74y old  Male who presents with a chief complaint of Abdominal pain (23 Apr 2020 11:56)    pt seen in icu [  ], reg med floor [  x ], bed [ x ], chair at bedside [   ], a+o x3 [x], lethargic [  ],    nad [ x ]    pt with no further c/o diarrhea but still feels weak    Allergies    No Known Allergies        Vitals    T(F): 98.7 (04-24-20 @ 00:20), Max: 99 (04-23-20 @ 16:00)  HR: 70 (04-24-20 @ 00:20) (67 - 78)  BP: 125/63 (04-24-20 @ 00:20) (120/62 - 138/72)  RR: 22 (04-24-20 @ 00:20) (22 - 24)  SpO2: 96% (04-24-20 @ 00:20) (96% - 100%)  Wt(kg): --  CAPILLARY BLOOD GLUCOSE          Labs                          12.2   8.79  )-----------( 255      ( 23 Apr 2020 06:13 )             37.8       04-23    135  |  98  |  11  ----------------------------<  190<H>  5.1   |  31  |  1.14    Ca    9.5      23 Apr 2020 06:13  Phos  3.6     04-23  Mg     2.5     04-23    TPro  7.9  /  Alb  2.6<L>  /  TBili  0.9  /  DBili  x   /  AST  125<H>  /  ALT  158<H>  /  AlkPhos  406<H>  04-23            .Stool para tanner  04-19 @ 14:11   No Protozoa seen by trichrome stain  No Helminths or Protozoa seen in formalin concentrate  performed by iodine stain  (routine O+P not evaluated for Microsporidia,  Cryptosporidia, Cyclospora, or Isospora.)  One negative sample does not necessarily rule  out the presence of a parasitic infection.  --  --      .Stool rubi rabia  04-19 @ 14:10   GI PCR Results: NOT detected  *******Please Note:*******  GI panel PCR evaluates for:  Campylobacter, Plesiomonas shigelloides, Salmonella,  Vibrio, Yersinia enterocolitica, Enteroaggregative  Escherichia coli (EAEC), Enteropathogenic E.coli (EPEC),  Enterotoxigenic E. coli (ETEC) lt/st, Shiga-like  toxin-producing E. coli (STEC) stx1/stx2,  Shigella/ Enteroinvasive E. coli (EIEC), Cryptosporidium,  Cyclospora cayetanensis, Entamoeba histolytica,  Giardia lamblia, Adenovirus F 40/41, Astrovirus,  Norovirus GI/GII, Rotavirus A, Sapovirus  --  --          Radiology Results      Meds    MEDICATIONS  (STANDING):  enoxaparin Injectable 40 milliGRAM(s) SubCutaneous daily  ergocalciferol 63086 Unit(s) Oral daily      MEDICATIONS  (PRN):  acetaminophen   Tablet .. 650 milliGRAM(s) Oral every 6 hours PRN Temp greater or equal to 38C (100.4F), Mild Pain (1 - 3)  acetaminophen   Tablet .. 650 milliGRAM(s) Oral every 6 hours PRN Moderate Pain (4 - 6)  ALBUTerol    90 MICROgram(s) HFA Inhaler 2 Puff(s) Inhalation every 4 hours PRN Shortness of Breath and/or Wheezing  aluminum hydroxide/magnesium hydroxide/simethicone Suspension 30 milliLiter(s) Oral every 6 hours PRN Dyspepsia  guaifenesin/dextromethorphan  Syrup 10 milliLiter(s) Oral every 4 hours PRN Cough  ondansetron Injectable 4 milliGRAM(s) IV Push every 6 hours PRN Nausea and/or Vomiting      Physical Exam    Neuro :  no focal deficits  Respiratory: CTA B/L  CV: RRR, S1S2, no murmurs,   Abdominal: Soft, NT, ND +BS,  Extremities: No edema, + peripheral pulses    ASSESSMENT    pneumonia 2nd to covid- 19,   hyponatremia,   saida likely 2nd to diarrhea 2nd to covid-19    hypotension likely 2nd to dehydration      PLAN      cont albuterol inhaler  pulm f/u  afebrile  tmx 99.5   O2 sat on ra 97% ( range 92% - 100%)  cont O2 via n/c if needed   cont incentive spirometer  covid test positive  completed plaquenil x 5 days  contact and airborne isolation  D-dimer, crp, ferritin noted   cont supportive care with tylenol prn, robitussin prn   serum creat wnl  stool pcr neg noted above  phys tx re-eval  cont current meds   d/c planning for possible lobito Patient is a 74y old  Male who presents with a chief complaint of Abdominal pain (23 Apr 2020 11:56)    pt seen in icu [  ], reg med floor [  x ], bed [ x ], chair at bedside [   ], a+o x3 [x], lethargic [  ],    nad [ x ]    pt with no further c/o diarrhea but still feels weak    Allergies    No Known Allergies        Vitals    T(F): 98.7 (04-24-20 @ 00:20), Max: 99 (04-23-20 @ 16:00)  HR: 70 (04-24-20 @ 00:20) (67 - 78)  BP: 125/63 (04-24-20 @ 00:20) (120/62 - 138/72)  RR: 22 (04-24-20 @ 00:20) (22 - 24)  SpO2: 96% (04-24-20 @ 00:20) (96% - 100%)  Wt(kg): --  CAPILLARY BLOOD GLUCOSE          Labs                          12.2   8.79  )-----------( 255      ( 23 Apr 2020 06:13 )             37.8       04-23    135  |  98  |  11  ----------------------------<  190<H>  5.1   |  31  |  1.14    Ca    9.5      23 Apr 2020 06:13  Phos  3.6     04-23  Mg     2.5     04-23    TPro  7.9  /  Alb  2.6<L>  /  TBili  0.9  /  DBili  x   /  AST  125<H>  /  ALT  158<H>  /  AlkPhos  406<H>  04-23            .Stool para tanner  04-19 @ 14:11   No Protozoa seen by trichrome stain  No Helminths or Protozoa seen in formalin concentrate  performed by iodine stain  (routine O+P not evaluated for Microsporidia,  Cryptosporidia, Cyclospora, or Isospora.)  One negative sample does not necessarily rule  out the presence of a parasitic infection.  --  --      .Stool rubi rabia  04-19 @ 14:10   GI PCR Results: NOT detected  *******Please Note:*******  GI panel PCR evaluates for:  Campylobacter, Plesiomonas shigelloides, Salmonella,  Vibrio, Yersinia enterocolitica, Enteroaggregative  Escherichia coli (EAEC), Enteropathogenic E.coli (EPEC),  Enterotoxigenic E. coli (ETEC) lt/st, Shiga-like  toxin-producing E. coli (STEC) stx1/stx2,  Shigella/ Enteroinvasive E. coli (EIEC), Cryptosporidium,  Cyclospora cayetanensis, Entamoeba histolytica,  Giardia lamblia, Adenovirus F 40/41, Astrovirus,  Norovirus GI/GII, Rotavirus A, Sapovirus  --  --          Radiology Results      Meds    MEDICATIONS  (STANDING):  enoxaparin Injectable 40 milliGRAM(s) SubCutaneous daily  ergocalciferol 70767 Unit(s) Oral daily      MEDICATIONS  (PRN):  acetaminophen   Tablet .. 650 milliGRAM(s) Oral every 6 hours PRN Temp greater or equal to 38C (100.4F), Mild Pain (1 - 3)  acetaminophen   Tablet .. 650 milliGRAM(s) Oral every 6 hours PRN Moderate Pain (4 - 6)  ALBUTerol    90 MICROgram(s) HFA Inhaler 2 Puff(s) Inhalation every 4 hours PRN Shortness of Breath and/or Wheezing  aluminum hydroxide/magnesium hydroxide/simethicone Suspension 30 milliLiter(s) Oral every 6 hours PRN Dyspepsia  guaifenesin/dextromethorphan  Syrup 10 milliLiter(s) Oral every 4 hours PRN Cough  ondansetron Injectable 4 milliGRAM(s) IV Push every 6 hours PRN Nausea and/or Vomiting      Physical Exam    Neuro :  no focal deficits  Respiratory: CTA B/L  CV: RRR, S1S2, no murmurs,   Abdominal: Soft, NT, ND +BS,  Extremities: No edema, + peripheral pulses    ASSESSMENT    pneumonia 2nd to covid- 19,   hyponatremia,   saida likely 2nd to diarrhea 2nd to covid-19    hypotension likely 2nd to dehydration      PLAN      cont albuterol inhaler  pulm f/u  afebrile  tmx 99   O2 sat on 2L n/c 96% ( range 92% - 100%)  cont O2 via n/c as needed   cont incentive spirometer  covid test positive  completed plaquenil x 5 days  contact and airborne isolation  D-dimer, crp, ferritin noted   cont supportive care with tylenol prn, robitussin prn   serum creat wnl  stool pcr neg noted above  phys tx re-eval  cont current meds   d/c planning for possible lobito

## 2020-04-24 NOTE — PROGRESS NOTE ADULT - SUBJECTIVE AND OBJECTIVE BOX
Pt is awake, alert, lying in bed in NAD.     INTERVAL HPI/OVERNIGHT EVENTS:      VITAL SIGNS:  T(F): 98.5 (04-24-20 @ 08:23)  HR: 108 (04-24-20 @ 11:12)  BP: 130/60 (04-24-20 @ 08:23)  RR: 20 (04-24-20 @ 11:12)  SpO2: 92% (04-24-20 @ 11:15)  Wt(kg): --  I&O's Detail    REVIEW OF SYSTEMS:    CONSTITUTIONAL:  No fevers, chills, sweats    HEENT:  Eyes:  No diplopia or blurred vision. ENT:  No earache, sore throat or runny nose.    CARDIOVASCULAR:  No pressure, squeezing, tightness, or heaviness about the chest; no palpitations.    RESPIRATORY:  Per HPI    GASTROINTESTINAL:  No abdominal pain, nausea, vomiting or diarrhea.    GENITOURINARY:  No dysuria, frequency or urgency.    NEUROLOGIC:  No paresthesias, fasciculations, seizures or weakness.    PSYCHIATRIC:  No disorder of thought or mood.      PHYSICAL EXAM:    Constitutional: Well developed and nourished  Eyes:Perrla  ENMT: normal  Neck:supple  Respiratory: good air entry  Cardiovascular: S1 S2 regular  Gastrointestinal: Soft, Non tender  Extremities: No edema  Vascular:normal  Neurological:Awake, alert,Ox3  Musculoskeletal:Normal    MEDICATIONS  (STANDING):  enoxaparin Injectable 40 milliGRAM(s) SubCutaneous daily  ergocalciferol 74261 Unit(s) Oral daily    MEDICATIONS  (PRN):  acetaminophen   Tablet .. 650 milliGRAM(s) Oral every 6 hours PRN Temp greater or equal to 38C (100.4F), Mild Pain (1 - 3)  acetaminophen   Tablet .. 650 milliGRAM(s) Oral every 6 hours PRN Moderate Pain (4 - 6)  ALBUTerol    90 MICROgram(s) HFA Inhaler 2 Puff(s) Inhalation every 4 hours PRN Shortness of Breath and/or Wheezing  aluminum hydroxide/magnesium hydroxide/simethicone Suspension 30 milliLiter(s) Oral every 6 hours PRN Dyspepsia  guaifenesin/dextromethorphan  Syrup 10 milliLiter(s) Oral every 4 hours PRN Cough  ondansetron Injectable 4 milliGRAM(s) IV Push every 6 hours PRN Nausea and/or Vomiting      Allergies    No Known Allergies    Intolerances    LABS:                        12.2   8.79  )-----------( 255      ( 23 Apr 2020 06:13 )             37.8     04-23    135  |  98  |  11  ----------------------------<  190<H>  5.1   |  31  |  1.14    Ca    9.5      23 Apr 2020 06:13  Phos  3.6     04-23  Mg     2.5     04-23    TPro  7.9  /  Alb  2.6<L>  /  TBili  0.9  /  DBili  x   /  AST  125<H>  /  ALT  158<H>  /  AlkPhos  406<H>  04-23          CAPILLARY BLOOD GLUCOSE            RADIOLOGY & ADDITIONAL TESTS:    CXR:    Ct scan chest:    ekg;    echo:

## 2020-04-24 NOTE — DISCHARGE NOTE NURSING/CASE MANAGEMENT/SOCIAL WORK - PATIENT PORTAL LINK FT
You can access the FollowMyHealth Patient Portal offered by [facility] by registering at the following website: [email or web address]. By joining [facility]’s FollowMyHealth portal, you will also be able to view your health information using other applications (apps) compatible with our system.

## 2020-04-24 NOTE — PROGRESS NOTE ADULT - REASON FOR ADMISSION
Abdominal pain

## 2020-05-12 PROBLEM — Z00.00 ENCOUNTER FOR PREVENTIVE HEALTH EXAMINATION: Status: ACTIVE | Noted: 2020-05-12

## 2020-05-13 ENCOUNTER — APPOINTMENT (OUTPATIENT)
Dept: PULMONOLOGY | Facility: CLINIC | Age: 75
End: 2020-05-13
Payer: MEDICARE

## 2020-05-13 VITALS
DIASTOLIC BLOOD PRESSURE: 69 MMHG | SYSTOLIC BLOOD PRESSURE: 136 MMHG | HEART RATE: 83 BPM | BODY MASS INDEX: 23.62 KG/M2 | WEIGHT: 165 LBS | RESPIRATION RATE: 16 BRPM | HEIGHT: 70 IN | OXYGEN SATURATION: 96 % | TEMPERATURE: 98.3 F

## 2020-05-13 DIAGNOSIS — U07.1 COVID-19: ICD-10-CM

## 2020-05-13 DIAGNOSIS — I10 ESSENTIAL (PRIMARY) HYPERTENSION: ICD-10-CM

## 2020-05-13 DIAGNOSIS — E11.9 TYPE 2 DIABETES MELLITUS W/OUT COMPLICATIONS: ICD-10-CM

## 2020-05-13 DIAGNOSIS — J12.82 COVID-19: ICD-10-CM

## 2020-05-13 DIAGNOSIS — Z86.39 PERSONAL HISTORY OF OTHER ENDOCRINE, NUTRITIONAL AND METABOLIC DISEASE: ICD-10-CM

## 2020-05-13 PROCEDURE — 99205 OFFICE O/P NEW HI 60 MIN: CPT | Mod: 25

## 2020-05-13 PROCEDURE — 36415 COLL VENOUS BLD VENIPUNCTURE: CPT

## 2020-05-13 RX ORDER — ATORVASTATIN CALCIUM 40 MG/1
40 TABLET, FILM COATED ORAL
Refills: 0 | Status: ACTIVE | COMMUNITY
Start: 2020-05-13

## 2020-05-13 RX ORDER — METFORMIN HYDROCHLORIDE 500 MG/1
500 TABLET, COATED ORAL
Refills: 0 | Status: ACTIVE | COMMUNITY
Start: 2020-05-13

## 2020-05-13 RX ORDER — METOPROLOL TARTRATE 25 MG/1
25 TABLET, FILM COATED ORAL
Refills: 0 | Status: ACTIVE | COMMUNITY
Start: 2020-05-13

## 2020-05-13 RX ORDER — OMEPRAZOLE 40 MG/1
40 CAPSULE, DELAYED RELEASE ORAL
Refills: 0 | Status: ACTIVE | COMMUNITY
Start: 2020-05-13

## 2020-05-13 RX ORDER — AMLODIPINE BESYLATE 5 MG/1
5 TABLET ORAL
Refills: 0 | Status: ACTIVE | COMMUNITY
Start: 2020-05-13

## 2020-05-13 NOTE — PHYSICAL EXAM
[No Acute Distress] : no acute distress [Normal Oropharynx] : normal oropharynx [Normal Appearance] : normal appearance [Supple] : supple [No Neck Mass] : no neck mass [Normal S1, S2] : normal s1, s2 [Clear to Auscultation Bilaterally] : clear to auscultation bilaterally [No Murmurs] : no murmurs [No JVD] : no jvd [Benign] : benign [Not Tender] : not tender [Normal to Percussion] : normal to percussion [No HSM] : no hsm [No Edema] : no edema [No Cyanosis] : no cyanosis [No Clubbing] : no clubbing [Normal Affect] : normal affect [Oriented x3] : oriented x3 [No Focal Deficits] : no focal deficits

## 2020-05-13 NOTE — HISTORY OF PRESENT ILLNESS
[Former] : former [TextBox_4] : RHEA FRANCO is a 74 year old  M referred for pulmonary evaluation for  COVID pneumonia\par S/p COVID 3 weeks ago. Hosp. [facility] from 4/16-5/1.\par \par Presently feels well sitting.\par Positive CASTILLO\par CASTILLO less than 1 flight stairs\par No fever \par No cough\par had abdominal symptoms during hospitalization resolved.\par \par Past pulmonary history. N\par Occupational Exposure. N\par Family history of pulmonary disease. N\par Recent travel N\par Pets N\par  [TextBox_13] : 29 [TextBox_11] : 1/2 [YearQuit] : 1990

## 2020-05-13 NOTE — ASSESSMENT
[FreeTextEntry1] : Labs drawn in office today\par Check D dimer\par Observation\par Will need PFT in future.

## 2020-05-13 NOTE — PROCEDURE
[FreeTextEntry1] : \par EXAM: XR CHEST AP OR PA 1V \par \par \par PROCEDURE DATE: 04/16/2020 \par \par \par \par INTERPRETATION: CLINICAL INFORMATION: Shortness of Breath, Cough, Fever \par \par TECHNIQUE: AP view of the chest. \par \par COMPARISON: None available. \par \par FINDINGS: Bibasilar airspace opacities. Blunting of the right lateral \par costophrenic angle which may represent a small effusion. Left hemidiaphragm \par is sharp; no left-sided pleural effusion. Cardiomediastinal silhouette is \par exaggerated by AP technique. Regional osseous structures are unremarkable. \par \par IMPRESSION: \par \par Bibasilar airspace opacities which may represent atelectasis or infiltrate. \par Blunting of the right lateral costophrenic angle which may represent a small \par effusion. \par \par \par JULIET LANDAVERDE D.O. ATTENDING RADIOLOGIST \par This document has been electronically signed. Apr 16 2020 4:10AM \par \par \par Retested 5/2 neg.

## 2020-05-13 NOTE — CONSULT LETTER
[Dear  ___] : Dear ~JUAN C, [Consult Letter:] : I had the pleasure of evaluating your patient, [unfilled]. [Consult Closing:] : Thank you very much for allowing me to participate in the care of this patient.  If you have any questions, please do not hesitate to contact me. [Please see my note below.] : Please see my note below. [Sincerely,] : Sincerely, [FreeTextEntry3] : Freedom Cain MD FCCP\par  [FreeTextEntry2] : Santosh Neely MD\par

## 2020-05-15 LAB
ALBUMIN SERPL ELPH-MCNC: 4.4 G/DL
ALP BLD-CCNC: 97 U/L
ALT SERPL-CCNC: 18 U/L
ANION GAP SERPL CALC-SCNC: 11 MMOL/L
AST SERPL-CCNC: 18 U/L
BASOPHILS # BLD AUTO: 0.04 K/UL
BASOPHILS NFR BLD AUTO: 0.5 %
BILIRUB SERPL-MCNC: 0.4 MG/DL
BUN SERPL-MCNC: 12 MG/DL
CALCIUM SERPL-MCNC: 9.5 MG/DL
CHLORIDE SERPL-SCNC: 98 MMOL/L
CO2 SERPL-SCNC: 27 MMOL/L
CREAT SERPL-MCNC: 1.05 MG/DL
CRP SERPL-MCNC: 0.2 MG/DL
DEPRECATED D DIMER PPP IA-ACNC: 181 NG/ML DDU
EOSINOPHIL # BLD AUTO: 0.28 K/UL
EOSINOPHIL NFR BLD AUTO: 3.4 %
FERRITIN SERPL-MCNC: 151 NG/ML
GLUCOSE SERPL-MCNC: 196 MG/DL
HCT VFR BLD CALC: 38.3 %
HGB BLD-MCNC: 12.2 G/DL
IMM GRANULOCYTES NFR BLD AUTO: 0.4 %
LYMPHOCYTES # BLD AUTO: 2.03 K/UL
LYMPHOCYTES NFR BLD AUTO: 24.9 %
MAN DIFF?: NORMAL
MCHC RBC-ENTMCNC: 28.8 PG
MCHC RBC-ENTMCNC: 31.9 GM/DL
MCV RBC AUTO: 90.3 FL
MONOCYTES # BLD AUTO: 0.78 K/UL
MONOCYTES NFR BLD AUTO: 9.6 %
NEUTROPHILS # BLD AUTO: 5 K/UL
NEUTROPHILS NFR BLD AUTO: 61.2 %
PLATELET # BLD AUTO: 154 K/UL
POTASSIUM SERPL-SCNC: 4.4 MMOL/L
PROCALCITONIN SERPL-MCNC: 0.05 NG/ML
PROT SERPL-MCNC: 7.1 G/DL
RBC # BLD: 4.24 M/UL
RBC # FLD: 14 %
SODIUM SERPL-SCNC: 137 MMOL/L
WBC # FLD AUTO: 8.16 K/UL

## 2021-03-08 NOTE — PHYSICAL THERAPY INITIAL EVALUATION ADULT - PATIENT/FAMILY/SIGNIFICANT OTHER GOALS STATEMENT, PT EVAL
Detail Level: Zone Initiate Treatment: AM:\\n1.  Wash your face with a gentle cleanser  \\n2.  Pat skin dry\\n3.  Apply a thin layer of Zilxi to the entire face  \\n4.  Apply an oil free moisturizer with SPF - recommend mineral based\\n\\nPM:\\n1.  Wash your face with a gentle cleanser\\n2.  Pat skin dry\\n3.  Apply a thin layer of Aklief to the entire face every other night working up to nightly  \\n4.  Apply an oil free moisturizer as needed for dryness\\n\\nOrally: \\nTake 1 tablet of Seysara 100 mg daily with food and water for 3 months Initiate Treatment: 1. Apply triamcinolone cream twice daily for 1-2 weeks as needed for flares on affected area of the wrists. \\n2. Apply Elidel cream twice daily over top, and as needed for maintenance thereafter.\\n3. Make sure to moisturize especially after hand washing and sanitizers - recommend Aveeno eczema therapy Initiate Treatment: Follow acne regimen. Plan: Consider Oracea for maintenance. return home; ambulate and transfer independently without difficulty and without incidence of shortness of breath

## 2021-06-20 NOTE — DIETITIAN INITIAL EVALUATION ADULT. - PERTINENT LABORATORY DATA
Head imaging was suggested but declined at this time. Please come back to the ED immediately for any worsening headaches, vision changes, nausea with vomiting, neck pain, confusion/behavioral changes, or numbness/tingling/weakness to arms or legs.    You have 11 stitches to the forehead. These will be in place for 5-7 days. They can be removed by your PCP or a local ICC/ED.    Keep the wounds clean and dry. Clean with warm water and mild soap, then thoroughly dry and cover with triple antibiotic ointment or bacitracin and bandage. Avoid submersion in water such as pools or lakes until the wounds have healed.    After the stitches are removed, you may use vitamin E ointment or similar over-the-counter remedies to decrease scar darkening. Wear sunscreen over the area or wear a hat for the next 6-12 months to avoid prolonged sun exposure that would cause a darker scar.    Take Tylenol or Ibuprofen for pain. Ice frequently to decrease swelling.    Come back to the ED for the above mentioned symptoms, or signs of infection to the wound (redness, warmth, pus), FEVERS, chest pain, or shortness of breath.  
, Alb 2.5,

## 2021-10-06 PROBLEM — U07.1 PNEUMONIA DUE TO COVID-19 VIRUS: Status: ACTIVE | Noted: 2020-05-13

## 2022-11-06 NOTE — ED ADULT NURSE NOTE - NS ED NURSE RECORD ANOTHER HT AND WT
Helen Odonnell RN  11/06/22 [id number] No

## 2023-10-13 NOTE — PROGRESS NOTE ADULT - ASSESSMENT
Attending Attestation (For Attendings USE Only)... 1.  PNA  - Likely 2nd to Covid-19.   - Covid-19 PCR positive   - CXR noted  - Completed Plaquenil   - Continue Vit C, Thiamine, Zinc   - Robitussin PRN for cough  - Tylenol PRN for temp  - Oxygen supplement PRN   - Monitor VS. temp and SpO2.   - DVT and GI PPX   - F/U CXR.   - Albuterol MDI - PRN  - Isolation Precautions  - D/C planning for [facility]    2. Dizziness  - S/P RRT with hypotension and bradycardia  - Monitor VS.     3. CARITO   - Avoid nephrotoxic drugs   - Monitor BMP

## 2025-02-06 NOTE — DIETITIAN INITIAL EVALUATION ADULT. - PROBLEM SELECTOR PROBLEM 1
patient would like a call regarding abnormal HPV test results. provider is out of office until 2-. Is there a provider able to advise?   
COVID-19 virus detected